# Patient Record
Sex: FEMALE | Race: WHITE | NOT HISPANIC OR LATINO | Employment: UNEMPLOYED | URBAN - METROPOLITAN AREA
[De-identification: names, ages, dates, MRNs, and addresses within clinical notes are randomized per-mention and may not be internally consistent; named-entity substitution may affect disease eponyms.]

---

## 2018-01-04 ENCOUNTER — HOSPITAL ENCOUNTER (OUTPATIENT)
Facility: MEDICAL CENTER | Age: 63
End: 2018-01-04
Attending: FAMILY MEDICINE
Payer: COMMERCIAL

## 2018-01-04 ENCOUNTER — OFFICE VISIT (OUTPATIENT)
Dept: INTERNAL MEDICINE | Facility: IMAGING CENTER | Age: 63
End: 2018-01-04
Payer: COMMERCIAL

## 2018-01-04 VITALS
OXYGEN SATURATION: 98 % | BODY MASS INDEX: 26.75 KG/M2 | HEIGHT: 63 IN | SYSTOLIC BLOOD PRESSURE: 128 MMHG | WEIGHT: 151 LBS | HEART RATE: 82 BPM | DIASTOLIC BLOOD PRESSURE: 75 MMHG | RESPIRATION RATE: 14 BRPM | TEMPERATURE: 97.5 F

## 2018-01-04 DIAGNOSIS — Z12.39 SCREENING BREAST EXAMINATION: ICD-10-CM

## 2018-01-04 DIAGNOSIS — Z12.11 SPECIAL SCREENING FOR MALIGNANT NEOPLASMS, COLON: ICD-10-CM

## 2018-01-04 DIAGNOSIS — Z11.59 NEED FOR HEPATITIS C SCREENING TEST: ICD-10-CM

## 2018-01-04 DIAGNOSIS — Z87.81 HISTORY OF FRACTURE: ICD-10-CM

## 2018-01-04 DIAGNOSIS — M85.80 OSTEOPENIA, UNSPECIFIED LOCATION: ICD-10-CM

## 2018-01-04 DIAGNOSIS — Z00.00 ANNUAL PHYSICAL EXAM: ICD-10-CM

## 2018-01-04 DIAGNOSIS — Z12.4 SCREENING FOR MALIGNANT NEOPLASM OF CERVIX: ICD-10-CM

## 2018-01-04 DIAGNOSIS — F41.9 ANXIETY: ICD-10-CM

## 2018-01-04 PROCEDURE — 99386 PREV VISIT NEW AGE 40-64: CPT | Performed by: FAMILY MEDICINE

## 2018-01-04 PROCEDURE — 88175 CYTOPATH C/V AUTO FLUID REDO: CPT

## 2018-01-04 RX ORDER — BUPROPION HYDROCHLORIDE 150 MG/1
150 TABLET, EXTENDED RELEASE ORAL 2 TIMES DAILY
Qty: 180 TAB | Refills: 1 | Status: SHIPPED | OUTPATIENT
Start: 2018-01-04 | End: 2018-12-12 | Stop reason: SDUPTHER

## 2018-01-04 ASSESSMENT — PATIENT HEALTH QUESTIONNAIRE - PHQ9: CLINICAL INTERPRETATION OF PHQ2 SCORE: 0

## 2018-01-05 LAB — CYTOLOGY REG CYTOL: NORMAL

## 2018-01-05 NOTE — PROGRESS NOTES
CC:  Annual exam.    HPI:  Gabriel is a 62 y.o. female patient here to reestablish care and for her annual exam.   I had taken care of her for many years. Been in 2014 her insurance changed. She has been getting intermittent care since then. She did get a mammogram in 2016. She is due for her colonoscopy. Her last Pap smear was 2014.  She generally feels well.  She has been working many hours in anticipation of hopefully retiring this fall.    She has a history of anxiety and is interested in getting back on Wellbutrin. She also went back to smoking and thinks that it will help her with that. She denies depression but has been under some increased stress.    Past Medical History:   Diagnosis Date   • Anxiety    • Arthritis     wrists, thumbs   • Chronic back pain    • Chronic neck pain    • Heart burn    • History of fracture 4/10/2014   • OSTEOPOROSIS        Past Surgical History:   Procedure Laterality Date   • BREAST BIOPSY  9/25/2009    Performed by JENNIFER GALAN at [facility]   • GANGLION EXCISION  9/26/08    Performed by INGRID OVIEDO at SURGERY [facility] ORS   • CARPAL TUNNEL ENDOSCOPIC  9/26/08    Performed by INGRID OVIEDO at SURGERY [facility] ORS   • COLONOSCOPY  2008    recheck 7 years   • LUMPECTOMY  1972    LEFT BREAST   • CHOLECYSTECTOMY         Family History   Problem Relation Age of Onset   • Hypertension Mother    • Heart Disease Mother      atrial fib   • Diabetes Father    • Cancer Father      pancreatic   • Heart Disease Sister      valvular   • Heart Disease Sister      sinus arrhythmia   • Hypertension Maternal Grandmother    • Stroke Maternal Grandmother        Social History   Substance Use Topics   • Smoking status: Current Every Day Smoker     Packs/day: 0.50     Years: 15.00     Types: Cigarettes     Last attempt to quit: 10/1/2000   • Smokeless tobacco: Never Used   • Alcohol use No     Ready to quit: Not Answered  Counseling given: Not  "Answered     Patient Active Problem List    Diagnosis Date Noted   • History of fracture 04/10/2014   • Osteopenia 08/16/2011   • Anxiety    • Chronic neck pain    • Chronic back pain      Current Outpatient Prescriptions   Medication Sig Dispense Refill   • buPROPion SR (WELLBUTRIN-SR) 150 MG TABLET SR 12 HR sustained-release tablet Take 1 Tab by mouth 2 times a day. 180 Tab 1     No current facility-administered medications for this visit.       Current supplements:none        REVIEW OF SYSTEMS:  GENERAL: No fatigue, no weight loss.  HEENT:  Ears--no earache, no change in hearing, no dizziness, no tinnitus.                 Eyes--no blurred vision, no discharge or pain                 Throat--No sore throat, no dysphagia, no hoarseness  CV:  No chest pain,dyspnea,palpitations or edema.  RESP:  No sob,cough,wheezing or hemoptysis.  GI: No dysphagia, heartburn,abdominal pain, nausea, vomiting, diarrhea or constipation.       No melena, jaundice, bleeding, incontinence or change in bowel habits.  :  No dysuria, polyuria, hematuria, incontinence, or nocturia.  MS:  No joint swelling, myalgias, or arthralgias.  NEURO:  No seizures, syncope, paralysis, tremor, or weakness.  SKIN: No new or concerning skin lesions or changes.   PSYCH: Mood fine.          /75   Pulse 82   Temp 36.4 °C (97.5 °F)   Resp 14   Ht 1.607 m (5' 3.25\")   Wt 68.5 kg (151 lb)   SpO2 98%   BMI 26.54 kg/m²  Body mass index is 26.54 kg/m².    PHYSICAL EXAM;  GENERAL;  WN/WD, No acute distress.   HEENT:  Head normocephalic and atraumatic.    PERRLA, EOMI. No conjunctival injection, no icterus.     TM's normal, nasal mucosa and mouth with no abnormalities.    Oropharynx is clear with no lesions.  NECK: Supple, no adenopathy or thyromegaly.  CV: RR. Normal S1,S2. No murmur, gallop or rub.          No JVD, Carotid pulses 2+ and sym. No bruits.  LUNGS:  Clear, no wheezes, rales or rhonchi.  BREASTS: Symmetric, no masses or tenderness. No " nipple discharge.  AXILLA:  No masses or tenderness.  ABDOMEN: Soft, NT, nondistended. Bowel sounds normal. No hepatosplenomegaly or masses. No rebound or guarding. No hernias  : external genitalia normal with no lesion. Vagina with no lesions or discharge. Cervix nontender with no lesion. Uterus nontender and normal size. Adnexa nontender with no mass.   EXTREMITIES:  No edema.   NEURO:  CN II-XII intact. Motor and sensation grossly intact.   SKIN: No rashes or abnormal lesions.  Psych: Mood and affect are appropriate.            Assessment and Plan. The following treatment and monitoring plan is recommended:   1. Annual physical exam  Pap smear today. Recommend monthly self breast exams and annual mammogram.  Lab work is ordered.  - CBC WITH DIFFERENTIAL; Future  - COMP METABOLIC PANEL; Future  - LIPID PROFILE; Future  - TSH WITH REFLEX TO FT4; Future  - VITAMIN D,25 HYDROXY; Future    2. Screening for malignant neoplasm of cervix  Pap smear    3. Screening breast examination    - MA-MAMMO SCREENING BILAT W/ZEB W/CAD; Future    4. Anxiety  Start Wellbutrin twice daily.  - buPROPion SR (WELLBUTRIN-SR) 150 MG TABLET SR 12 HR sustained-release tablet; Take 1 Tab by mouth 2 times a day.  Dispense: 180 Tab; Refill: 1    5. Osteopenia, unspecified location  Encouraged calcium, vitamin D and exercise    - DS-BONE DENSITY STUDY (DEXA); Future  - VITAMIN D,25 HYDROXY; Future    6. History of fracture    - DS-BONE DENSITY STUDY (DEXA); Future    7. Special screening for malignant neoplasms, colon  She is due for her colonoscopy this year.  - REFERRAL TO GASTROENTEROLOGY    8. Need for hepatitis C screening test    - HEP C VIRUS ANTIBODY; Future    9. Smoker discussed smoking cessation. She'll start Wellbutrin.    10. Healthcare Maintenance. Counseled re: nutrition, activity and safety. Reviewed immunizations.     Follow-up 1 year and as needed. .       ·

## 2018-01-24 ENCOUNTER — HOSPITAL ENCOUNTER (OUTPATIENT)
Dept: RADIOLOGY | Facility: MEDICAL CENTER | Age: 63
End: 2018-01-24

## 2018-02-01 ENCOUNTER — NON-PROVIDER VISIT (OUTPATIENT)
Dept: INTERNAL MEDICINE | Facility: IMAGING CENTER | Age: 63
End: 2018-02-01
Payer: COMMERCIAL

## 2018-02-01 ENCOUNTER — HOSPITAL ENCOUNTER (OUTPATIENT)
Facility: MEDICAL CENTER | Age: 63
End: 2018-02-01
Attending: FAMILY MEDICINE
Payer: COMMERCIAL

## 2018-02-01 DIAGNOSIS — M85.80 OSTEOPENIA, UNSPECIFIED LOCATION: ICD-10-CM

## 2018-02-01 DIAGNOSIS — Z11.59 NEED FOR HEPATITIS C SCREENING TEST: ICD-10-CM

## 2018-02-01 DIAGNOSIS — Z01.89 ROUTINE LAB DRAW: ICD-10-CM

## 2018-02-01 DIAGNOSIS — Z00.00 ANNUAL PHYSICAL EXAM: ICD-10-CM

## 2018-02-01 LAB
25(OH)D3 SERPL-MCNC: 4 NG/ML (ref 30–100)
ALBUMIN SERPL BCP-MCNC: 4.5 G/DL (ref 3.2–4.9)
ALBUMIN/GLOB SERPL: 1.7 G/DL
ALP SERPL-CCNC: 72 U/L (ref 30–99)
ALT SERPL-CCNC: 19 U/L (ref 2–50)
ANION GAP SERPL CALC-SCNC: 5 MMOL/L (ref 0–11.9)
AST SERPL-CCNC: 18 U/L (ref 12–45)
BASOPHILS # BLD AUTO: 1.3 % (ref 0–1.8)
BASOPHILS # BLD: 0.13 K/UL (ref 0–0.12)
BILIRUB SERPL-MCNC: 0.4 MG/DL (ref 0.1–1.5)
BUN SERPL-MCNC: 11 MG/DL (ref 8–22)
CALCIUM SERPL-MCNC: 9.6 MG/DL (ref 8.5–10.5)
CHLORIDE SERPL-SCNC: 108 MMOL/L (ref 96–112)
CHOLEST SERPL-MCNC: 159 MG/DL (ref 100–199)
CO2 SERPL-SCNC: 24 MMOL/L (ref 20–33)
CREAT SERPL-MCNC: 0.93 MG/DL (ref 0.5–1.4)
EOSINOPHIL # BLD AUTO: 0.2 K/UL (ref 0–0.51)
EOSINOPHIL NFR BLD: 2 % (ref 0–6.9)
ERYTHROCYTE [DISTWIDTH] IN BLOOD BY AUTOMATED COUNT: 44.8 FL (ref 35.9–50)
GLOBULIN SER CALC-MCNC: 2.7 G/DL (ref 1.9–3.5)
GLUCOSE SERPL-MCNC: 101 MG/DL (ref 65–99)
HCT VFR BLD AUTO: 48.2 % (ref 37–47)
HCV AB SER QL: NEGATIVE
HDLC SERPL-MCNC: 45 MG/DL
HGB BLD-MCNC: 16.1 G/DL (ref 12–16)
IMM GRANULOCYTES # BLD AUTO: 0.02 K/UL (ref 0–0.11)
IMM GRANULOCYTES NFR BLD AUTO: 0.2 % (ref 0–0.9)
LDLC SERPL CALC-MCNC: 82 MG/DL
LYMPHOCYTES # BLD AUTO: 3.66 K/UL (ref 1–4.8)
LYMPHOCYTES NFR BLD: 36.4 % (ref 22–41)
MCH RBC QN AUTO: 31.3 PG (ref 27–33)
MCHC RBC AUTO-ENTMCNC: 33.4 G/DL (ref 33.6–35)
MCV RBC AUTO: 93.8 FL (ref 81.4–97.8)
MONOCYTES # BLD AUTO: 0.55 K/UL (ref 0–0.85)
MONOCYTES NFR BLD AUTO: 5.5 % (ref 0–13.4)
NEUTROPHILS # BLD AUTO: 5.49 K/UL (ref 2–7.15)
NEUTROPHILS NFR BLD: 54.6 % (ref 44–72)
NRBC # BLD AUTO: 0 K/UL
NRBC BLD-RTO: 0 /100 WBC
PLATELET # BLD AUTO: 241 K/UL (ref 164–446)
PMV BLD AUTO: 12.1 FL (ref 9–12.9)
POTASSIUM SERPL-SCNC: 4 MMOL/L (ref 3.6–5.5)
PROT SERPL-MCNC: 7.2 G/DL (ref 6–8.2)
RBC # BLD AUTO: 5.14 M/UL (ref 4.2–5.4)
SODIUM SERPL-SCNC: 137 MMOL/L (ref 135–145)
TRIGL SERPL-MCNC: 159 MG/DL (ref 0–149)
TSH SERPL DL<=0.005 MIU/L-ACNC: 3.32 UIU/ML (ref 0.38–5.33)
WBC # BLD AUTO: 10.1 K/UL (ref 4.8–10.8)

## 2018-02-01 PROCEDURE — 80061 LIPID PANEL: CPT

## 2018-02-01 PROCEDURE — 86803 HEPATITIS C AB TEST: CPT

## 2018-02-01 PROCEDURE — 80053 COMPREHEN METABOLIC PANEL: CPT

## 2018-02-01 PROCEDURE — 84443 ASSAY THYROID STIM HORMONE: CPT

## 2018-02-01 PROCEDURE — 82306 VITAMIN D 25 HYDROXY: CPT

## 2018-02-01 PROCEDURE — 85025 COMPLETE CBC W/AUTO DIFF WBC: CPT

## 2018-02-02 ENCOUNTER — TELEPHONE (OUTPATIENT)
Dept: INTERNAL MEDICINE | Facility: IMAGING CENTER | Age: 63
End: 2018-02-02

## 2018-02-02 DIAGNOSIS — E55.9 VITAMIN D DEFICIENCY: ICD-10-CM

## 2018-02-02 RX ORDER — ERGOCALCIFEROL 1.25 MG/1
50000 CAPSULE ORAL
Qty: 4 CAP | Refills: 3 | Status: SHIPPED | OUTPATIENT
Start: 2018-02-02 | End: 2018-06-01 | Stop reason: SDUPTHER

## 2018-02-02 NOTE — TELEPHONE ENCOUNTER
Discussed results. Vitamin D is very low. Start 50,000 international units every week and recheck in 3 months.

## 2018-02-05 ENCOUNTER — HOSPITAL ENCOUNTER (OUTPATIENT)
Dept: RADIOLOGY | Facility: MEDICAL CENTER | Age: 63
End: 2018-02-05
Attending: FAMILY MEDICINE
Payer: COMMERCIAL

## 2018-02-05 DIAGNOSIS — M85.80 OSTEOPENIA, UNSPECIFIED LOCATION: ICD-10-CM

## 2018-02-05 DIAGNOSIS — Z12.39 SCREENING BREAST EXAMINATION: ICD-10-CM

## 2018-02-05 DIAGNOSIS — Z87.81 HISTORY OF FRACTURE: ICD-10-CM

## 2018-02-05 PROCEDURE — 77067 SCR MAMMO BI INCL CAD: CPT

## 2018-02-05 PROCEDURE — 77080 DXA BONE DENSITY AXIAL: CPT

## 2018-02-07 ENCOUNTER — TELEPHONE (OUTPATIENT)
Dept: INTERNAL MEDICINE | Facility: IMAGING CENTER | Age: 63
End: 2018-02-07

## 2018-02-07 DIAGNOSIS — L60.0 INGROWN TOENAIL: ICD-10-CM

## 2018-02-07 RX ORDER — ALENDRONATE SODIUM 70 MG/1
70 TABLET ORAL
Qty: 4 TAB | Refills: 12 | Status: SHIPPED | OUTPATIENT
Start: 2018-02-07 | End: 2019-02-23 | Stop reason: SDUPTHER

## 2018-02-08 NOTE — TELEPHONE ENCOUNTER
Discussed side effects. She is osteopenic. Recommend calcium with D which we just added. Her vitamin D was quite low. Also recommend Fosamax weekly. Recheck in 1 year. Discussed side effects.    She also is having difficulties with an ingrown toenail and would like referral to podiatry.

## 2018-05-29 DIAGNOSIS — E55.9 VITAMIN D DEFICIENCY: ICD-10-CM

## 2018-05-30 ENCOUNTER — HOSPITAL ENCOUNTER (OUTPATIENT)
Facility: MEDICAL CENTER | Age: 63
End: 2018-05-30
Attending: FAMILY MEDICINE
Payer: COMMERCIAL

## 2018-05-30 ENCOUNTER — NON-PROVIDER VISIT (OUTPATIENT)
Dept: INTERNAL MEDICINE | Facility: IMAGING CENTER | Age: 63
End: 2018-05-30
Payer: COMMERCIAL

## 2018-05-30 DIAGNOSIS — E55.9 VITAMIN D DEFICIENCY: ICD-10-CM

## 2018-05-30 DIAGNOSIS — Z01.89 ROUTINE LAB DRAW: ICD-10-CM

## 2018-05-30 LAB — 25(OH)D3 SERPL-MCNC: 30 NG/ML (ref 30–100)

## 2018-05-30 PROCEDURE — 82306 VITAMIN D 25 HYDROXY: CPT

## 2018-06-01 DIAGNOSIS — E55.9 VITAMIN D DEFICIENCY: ICD-10-CM

## 2018-06-01 RX ORDER — ERGOCALCIFEROL 1.25 MG/1
50000 CAPSULE ORAL
Qty: 4 CAP | Refills: 3 | Status: SHIPPED | OUTPATIENT
Start: 2018-06-01 | End: 2018-10-22 | Stop reason: SDUPTHER

## 2018-06-08 DIAGNOSIS — E55.9 VITAMIN D DEFICIENCY: ICD-10-CM

## 2018-06-08 RX ORDER — ERGOCALCIFEROL 1.25 MG/1
50000 CAPSULE ORAL
Qty: 4 CAP | Refills: 3 | OUTPATIENT
Start: 2018-06-08

## 2018-09-06 DIAGNOSIS — Z12.11 SPECIAL SCREENING FOR MALIGNANT NEOPLASMS, COLON: ICD-10-CM

## 2018-10-22 DIAGNOSIS — E55.9 VITAMIN D DEFICIENCY: ICD-10-CM

## 2018-10-22 RX ORDER — ERGOCALCIFEROL 1.25 MG/1
CAPSULE ORAL
Qty: 4 CAP | Refills: 3 | Status: SHIPPED | OUTPATIENT
Start: 2018-10-22 | End: 2019-03-22 | Stop reason: SDUPTHER

## 2018-12-12 DIAGNOSIS — F41.9 ANXIETY: ICD-10-CM

## 2018-12-12 RX ORDER — BUPROPION HYDROCHLORIDE 150 MG/1
150 TABLET, EXTENDED RELEASE ORAL 2 TIMES DAILY
Qty: 180 TAB | Refills: 1 | Status: SHIPPED | OUTPATIENT
Start: 2018-12-12 | End: 2019-11-11

## 2019-01-05 DIAGNOSIS — J06.9 UPPER RESPIRATORY TRACT INFECTION, UNSPECIFIED TYPE: ICD-10-CM

## 2019-01-05 RX ORDER — AZITHROMYCIN 250 MG/1
TABLET, FILM COATED ORAL
Qty: 6 TAB | Refills: 0 | Status: SHIPPED | OUTPATIENT
Start: 2019-01-05 | End: 2019-11-11

## 2019-02-25 RX ORDER — ALENDRONATE SODIUM 70 MG/1
70 TABLET ORAL
Qty: 4 TAB | Refills: 9 | Status: SHIPPED | OUTPATIENT
Start: 2019-02-25 | End: 2021-05-17

## 2019-03-22 DIAGNOSIS — E55.9 VITAMIN D DEFICIENCY: ICD-10-CM

## 2019-03-22 RX ORDER — ERGOCALCIFEROL 1.25 MG/1
CAPSULE ORAL
Qty: 12 CAP | Refills: 3 | Status: SHIPPED | OUTPATIENT
Start: 2019-03-22 | End: 2021-05-17

## 2019-11-11 ENCOUNTER — OFFICE VISIT (OUTPATIENT)
Dept: INTERNAL MEDICINE | Facility: IMAGING CENTER | Age: 64
End: 2019-11-11
Payer: COMMERCIAL

## 2019-11-11 ENCOUNTER — HOSPITAL ENCOUNTER (OUTPATIENT)
Dept: RADIOLOGY | Facility: MEDICAL CENTER | Age: 64
End: 2019-11-11
Attending: FAMILY MEDICINE
Payer: COMMERCIAL

## 2019-11-11 VITALS
SYSTOLIC BLOOD PRESSURE: 124 MMHG | OXYGEN SATURATION: 98 % | DIASTOLIC BLOOD PRESSURE: 72 MMHG | RESPIRATION RATE: 14 BRPM | HEART RATE: 97 BPM | BODY MASS INDEX: 26.8 KG/M2 | TEMPERATURE: 97.8 F | WEIGHT: 157 LBS | HEIGHT: 64 IN

## 2019-11-11 DIAGNOSIS — Z23 NEED FOR VACCINATION: ICD-10-CM

## 2019-11-11 DIAGNOSIS — E55.9 VITAMIN D DEFICIENCY: ICD-10-CM

## 2019-11-11 DIAGNOSIS — R07.81 RIB PAIN ON RIGHT SIDE: ICD-10-CM

## 2019-11-11 DIAGNOSIS — F17.200 TOBACCO DEPENDENCE: ICD-10-CM

## 2019-11-11 DIAGNOSIS — Z00.00 PREVENTATIVE HEALTH CARE: ICD-10-CM

## 2019-11-11 DIAGNOSIS — Z12.39 SCREENING BREAST EXAMINATION: ICD-10-CM

## 2019-11-11 PROCEDURE — 90471 IMMUNIZATION ADMIN: CPT | Performed by: FAMILY MEDICINE

## 2019-11-11 PROCEDURE — 99214 OFFICE O/P EST MOD 30 MIN: CPT | Mod: 25 | Performed by: FAMILY MEDICINE

## 2019-11-11 PROCEDURE — 90686 IIV4 VACC NO PRSV 0.5 ML IM: CPT | Performed by: FAMILY MEDICINE

## 2019-11-11 PROCEDURE — 71101 X-RAY EXAM UNILAT RIBS/CHEST: CPT | Mod: RT

## 2019-11-11 RX ORDER — MELOXICAM 7.5 MG/1
7.5 TABLET ORAL DAILY
Qty: 30 TAB | Refills: 0 | Status: SHIPPED | OUTPATIENT
Start: 2019-11-11 | End: 2020-02-20

## 2019-11-11 RX ORDER — VARENICLINE TARTRATE 1 MG/1
1 TABLET, FILM COATED ORAL 2 TIMES DAILY
Qty: 60 TAB | Refills: 2 | Status: SHIPPED | OUTPATIENT
Start: 2019-11-11 | End: 2021-05-17

## 2019-11-11 NOTE — PROGRESS NOTES
Chief Complaint   Patient presents with   • Rib Pain     right side x 6 weeks       HISTORY OF PRESENT ILLNESS: Patient is a 64 y.o. female established patient who presents today complaining of right-sided lower rib pain for the past 6 weeks.  Her  had orthopedic surgery and has been in a wheelchair.  On October 1 she was pushing him up a little bit of an incline and she felt pain in her right lower ribs.  It has continued since then.  It is worse with movements.  She states there is always a dull ache there.  Worse with deep inspiration.  Worse with a cough.  No hemoptysis.  No fevers or chills.  She has tried Tylenol for pain with no improvement.    She also smokes and is interested in some helped to stop smoking.  She is smoking almost a pack a day.  She tried Wellbutrin with out much success.      Patient Active Problem List    Diagnosis Date Noted   • Tobacco dependence 11/11/2019   • Vitamin D deficiency 05/29/2018   • History of fracture 04/10/2014   • Osteopenia 08/16/2011   • Anxiety    • Chronic neck pain    • Chronic back pain      Current Outpatient Medications on File Prior to Visit   Medication Sig Dispense Refill   • vitamin D, Ergocalciferol, (DRISDOL) 23615 units Cap capsule TAKE ONE CAPSULE BY MOUTH EVERY 7 DAYS 12 Cap 3   • alendronate (FOSAMAX) 70 MG Tab TAKE 1 TAB BY MOUTH EVERY 7 DAYS. 4 Tab 9     No current facility-administered medications on file prior to visit.          Past medical, surgical, family, and social history is reviewed and updated in Epic chart by me today.   Medications and allergies reviewed and updated in Epic chart by me today.     REVIEW OF SYSTEMS:  GENERAL: No fatigue, no weight loss.  HEENT:  Ears--no earache, no change in hearing, no dizziness, no tinnitus.                 Eyes--no blurred vision, no discharge or pain                 Throat--No sore throat, no dysphagia, no hoarseness  CV:  No chest pain,dyspnea,palpitations or edema.  RESP:  No  "sob,cough,wheezing or hemoptysis.  GI: No dysphagia, heartburn,abdominal pain, nausea, vomiting, diarrhea or constipation.       No melena, jaundice, bleeding, incontinence or change in bowel habits.  :  No dysuria, polyuria, hematuria, incontinence, or nocturia.  MS: As above  NEURO:  No seizures, syncope, paralysis, tremor, or weakness.  SKIN: No new or concerning skin lesions or changes.   PSYCH: Mood fine.    Vitals:    11/11/19 0800   BP: 124/72   Pulse: 97   Resp: 14   Temp: 36.6 °C (97.8 °F)   TempSrc: Temporal   SpO2: 98%   Weight: 71.2 kg (157 lb)   Height: 1.626 m (5' 4\")     Physical Exam:  Gen: Well developed, well nourished. No acute distress.  Neck:  Supple, no adenopathy or thyromegaly.  Heart:  Regular rate and rhythm.  Normal S1, S2. No murmur, gallop or rub.  Lungs:  Clear, No wheezes,rales or rhonchi.  Her thoracic spine is nontender.  She does have tenderness along her lower right ribs anteriorly and in the mid axillary line.  No crepitus.  No skin changes.  Extremities:  No edema.  Psych: Mood and affect are appropriate.        Assessment/Plan:  1. Rib pain on right side.  Recommend x-ray of her ribs.  She does have osteopenia and is on Fosamax.  I would like to rule out a fracture.  She may take meloxicam for pain. [id number] (WITH 1-VIEW CXR) RIGHT    meloxicam (MOBIC) 7.5 MG Tab   2. Tobacco dependence.  Discussed Chantix.  Encouraged her to call for any concerns. varenicline (CHANTIX STARTING MONTH MONICA) 0.5 MG X 11 & 1 MG X 42 tablet    varenicline (CHANTIX CONTINUING MONTH MONICA) 1 MG tablet   3. Need for vaccination  Influenza Vaccine Quad Injection (PF)   4. Preventative health care  CBC WITH DIFFERENTIAL    Comp Metabolic Panel    Lipid Profile    TSH WITH REFLEX TO FT4   5. Vitamin D deficiency  VITAMIN D,25 HYDROXY   6. Screening breast examination  MA-SCREENING MAMMO BILAT W/TOMOSYNTHESIS W/CAD     She will follow-up in the next 1 to 2 months for her annual exam.  "

## 2019-11-14 ENCOUNTER — HOSPITAL ENCOUNTER (OUTPATIENT)
Facility: MEDICAL CENTER | Age: 64
End: 2019-11-14
Attending: FAMILY MEDICINE
Payer: COMMERCIAL

## 2019-11-14 ENCOUNTER — NON-PROVIDER VISIT (OUTPATIENT)
Dept: INTERNAL MEDICINE | Facility: IMAGING CENTER | Age: 64
End: 2019-11-14
Payer: COMMERCIAL

## 2019-11-14 DIAGNOSIS — E55.9 VITAMIN D DEFICIENCY: ICD-10-CM

## 2019-11-14 DIAGNOSIS — Z00.00 PREVENTATIVE HEALTH CARE: ICD-10-CM

## 2019-11-14 LAB
ALBUMIN SERPL BCP-MCNC: 4.5 G/DL (ref 3.2–4.9)
ALBUMIN/GLOB SERPL: 1.9 G/DL
ALP SERPL-CCNC: 80 U/L (ref 30–99)
ALT SERPL-CCNC: 18 U/L (ref 2–50)
ANION GAP SERPL CALC-SCNC: 6 MMOL/L (ref 0–11.9)
AST SERPL-CCNC: 18 U/L (ref 12–45)
BASOPHILS # BLD AUTO: 1.1 % (ref 0–1.8)
BASOPHILS # BLD: 0.11 K/UL (ref 0–0.12)
BILIRUB SERPL-MCNC: 0.3 MG/DL (ref 0.1–1.5)
BUN SERPL-MCNC: 13 MG/DL (ref 8–22)
CALCIUM SERPL-MCNC: 9.1 MG/DL (ref 8.5–10.5)
CHLORIDE SERPL-SCNC: 110 MMOL/L (ref 96–112)
CHOLEST SERPL-MCNC: 149 MG/DL (ref 100–199)
CO2 SERPL-SCNC: 26 MMOL/L (ref 20–33)
CREAT SERPL-MCNC: 0.89 MG/DL (ref 0.5–1.4)
EOSINOPHIL # BLD AUTO: 0.25 K/UL (ref 0–0.51)
EOSINOPHIL NFR BLD: 2.6 % (ref 0–6.9)
ERYTHROCYTE [DISTWIDTH] IN BLOOD BY AUTOMATED COUNT: 47.1 FL (ref 35.9–50)
GLOBULIN SER CALC-MCNC: 2.4 G/DL (ref 1.9–3.5)
GLUCOSE SERPL-MCNC: 96 MG/DL (ref 65–99)
HCT VFR BLD AUTO: 49.5 % (ref 37–47)
HDLC SERPL-MCNC: 41 MG/DL
HGB BLD-MCNC: 16.2 G/DL (ref 12–16)
IMM GRANULOCYTES # BLD AUTO: 0.03 K/UL (ref 0–0.11)
IMM GRANULOCYTES NFR BLD AUTO: 0.3 % (ref 0–0.9)
LDLC SERPL CALC-MCNC: 78 MG/DL
LYMPHOCYTES # BLD AUTO: 2.92 K/UL (ref 1–4.8)
LYMPHOCYTES NFR BLD: 29.9 % (ref 22–41)
MCH RBC QN AUTO: 31.2 PG (ref 27–33)
MCHC RBC AUTO-ENTMCNC: 32.7 G/DL (ref 33.6–35)
MCV RBC AUTO: 95.4 FL (ref 81.4–97.8)
MONOCYTES # BLD AUTO: 0.55 K/UL (ref 0–0.85)
MONOCYTES NFR BLD AUTO: 5.6 % (ref 0–13.4)
NEUTROPHILS # BLD AUTO: 5.91 K/UL (ref 2–7.15)
NEUTROPHILS NFR BLD: 60.5 % (ref 44–72)
NRBC # BLD AUTO: 0 K/UL
NRBC BLD-RTO: 0 /100 WBC
PLATELET # BLD AUTO: 169 K/UL (ref 164–446)
PMV BLD AUTO: 12.5 FL (ref 9–12.9)
POTASSIUM SERPL-SCNC: 4.2 MMOL/L (ref 3.6–5.5)
PROT SERPL-MCNC: 6.9 G/DL (ref 6–8.2)
RBC # BLD AUTO: 5.19 M/UL (ref 4.2–5.4)
SODIUM SERPL-SCNC: 142 MMOL/L (ref 135–145)
TRIGL SERPL-MCNC: 148 MG/DL (ref 0–149)
TSH SERPL DL<=0.005 MIU/L-ACNC: 2.62 UIU/ML (ref 0.38–5.33)
WBC # BLD AUTO: 9.8 K/UL (ref 4.8–10.8)

## 2019-11-14 PROCEDURE — 80061 LIPID PANEL: CPT

## 2019-11-14 PROCEDURE — 84443 ASSAY THYROID STIM HORMONE: CPT

## 2019-11-14 PROCEDURE — 82306 VITAMIN D 25 HYDROXY: CPT

## 2019-11-14 PROCEDURE — 80053 COMPREHEN METABOLIC PANEL: CPT

## 2019-11-14 PROCEDURE — 85025 COMPLETE CBC W/AUTO DIFF WBC: CPT

## 2019-11-15 LAB — 25(OH)D3 SERPL-MCNC: 30 NG/ML (ref 30–100)

## 2019-11-20 ENCOUNTER — OFFICE VISIT (OUTPATIENT)
Dept: INTERNAL MEDICINE | Facility: IMAGING CENTER | Age: 64
End: 2019-11-20
Payer: COMMERCIAL

## 2019-11-20 VITALS
WEIGHT: 157 LBS | HEART RATE: 87 BPM | BODY MASS INDEX: 26.8 KG/M2 | SYSTOLIC BLOOD PRESSURE: 122 MMHG | DIASTOLIC BLOOD PRESSURE: 70 MMHG | RESPIRATION RATE: 14 BRPM | OXYGEN SATURATION: 98 % | HEIGHT: 64 IN | TEMPERATURE: 96.8 F

## 2019-11-20 DIAGNOSIS — L98.9 SKIN LESIONS: ICD-10-CM

## 2019-11-20 DIAGNOSIS — M85.80 OSTEOPENIA, UNSPECIFIED LOCATION: ICD-10-CM

## 2019-11-20 DIAGNOSIS — E55.9 VITAMIN D DEFICIENCY: ICD-10-CM

## 2019-11-20 DIAGNOSIS — Z00.00 ANNUAL PHYSICAL EXAM: ICD-10-CM

## 2019-11-20 DIAGNOSIS — F17.200 TOBACCO DEPENDENCE: ICD-10-CM

## 2019-11-20 DIAGNOSIS — L82.1 SK (SEBORRHEIC KERATOSIS): ICD-10-CM

## 2019-11-20 PROCEDURE — 99396 PREV VISIT EST AGE 40-64: CPT | Performed by: FAMILY MEDICINE

## 2019-11-20 ASSESSMENT — PATIENT HEALTH QUESTIONNAIRE - PHQ9: CLINICAL INTERPRETATION OF PHQ2 SCORE: 0

## 2019-11-21 NOTE — PROGRESS NOTES
CC:  Annual exam.    HPI:  Gabriel is a 64 y.o. established patient here for her annual exam.    She is generally been doing well.  She was in a couple weeks ago with some rib pain that started after pushing her  in a wheelchair.  She has been taking meloxicam and is doing better.  Still has a little pain but much improved.    She is up-to-date on her Pap smear.  She does have her mammogram and equal density scan scheduled.  She does have osteopenia.  Is on Fosamax weekly.  She has been on this for only about 1 year.  She is tolerating it fine.  She is up-to-date on her colonoscopy    She would like a dermatology referral.  She has several lesions checked.  She has one large lesion on her left side that catches on clothing itches and it is sometimes there is some tenderness.  It has been there for years and it is getting larger.    Past Medical History:   Diagnosis Date   • Anxiety    • Arthritis     wrists, thumbs   • Chronic back pain    • Chronic neck pain    • Heart burn    • History of fracture 4/10/2014   • OSTEOPOROSIS        Past Surgical History:   Procedure Laterality Date   • US-NEEDLE CORE BX-BREAST PANEL Right 2011    benign   • BREAST BIOPSY  9/25/2009    Performed by JENNIFER GALAN at SURGERY [facility] ORS   • GANGLION EXCISION  9/26/08    Performed by INGRID OVIEDO at SURGERY [facility] ORS   • CARPAL TUNNEL ENDOSCOPIC  9/26/08    Performed by INGRID OVIEDO at SURGERY [facility] ORS   • COLONOSCOPY  2008    recheck 7 years   • US-NEEDLE CORE BX-BREAST PANEL Left 1973    benign   • LUMPECTOMY  1972    LEFT BREAST   • CHOLECYSTECTOMY         Family History   Problem Relation Age of Onset   • Hypertension Mother    • Heart Disease Mother         atrial fib   • Diabetes Father    • Cancer Father         pancreatic   • Heart Disease Sister         valvular   • Heart Disease Brother         a fib   • Heart Disease Sister         sinus arrhythmia   • Hypertension  Maternal Grandmother    • Stroke Maternal Grandmother        Social History     Tobacco Use   • Smoking status: Current Every Day Smoker     Packs/day: 0.50     Years: 15.00     Pack years: 7.50     Types: Cigarettes     Last attempt to quit: 10/1/2000     Years since quittin.1   • Smokeless tobacco: Never Used   Substance Use Topics   • Alcohol use: No   • Drug use: No     Ready to quit: Not Answered  Counseling given: Not Answered     Patient Active Problem List    Diagnosis Date Noted   • Tobacco dependence 2019   • Vitamin D deficiency 2018   • History of fracture 04/10/2014   • Osteopenia 2011   • Anxiety    • Chronic neck pain    • Chronic back pain      Current Outpatient Medications   Medication Sig Dispense Refill   • meloxicam (MOBIC) 7.5 MG Tab Take 1 Tab by mouth every day. For rib pain 30 Tab 0   • varenicline (CHANTIX STARTING MONTH MONICA) 0.5 MG X 11 & 1 MG X 42 tablet Take as directed 56 Tab 3   • varenicline (CHANTIX CONTINUING MONTH ) 1 MG tablet Take 1 Tab by mouth 2 times a day. 60 Tab 2   • vitamin D, Ergocalciferol, (DRISDOL) 56977 units Cap capsule TAKE ONE CAPSULE BY MOUTH EVERY 7 DAYS 12 Cap 3   • alendronate (FOSAMAX) 70 MG Tab TAKE 1 TAB BY MOUTH EVERY 7 DAYS. 4 Tab 9     No current facility-administered medications for this visit.           REVIEW OF SYSTEMS:  GENERAL: No fatigue, no weight loss.  HEENT:  Ears--no earache, no change in hearing, no dizziness, no tinnitus.                 Eyes--no blurred vision, no discharge or pain                 Throat--No sore throat, no dysphagia, no hoarseness  CV:  No chest pain,dyspnea,palpitations or edema.  RESP:  No sob,cough,wheezing or hemoptysis.  GI: No dysphagia, heartburn,abdominal pain, nausea, vomiting, diarrhea or constipation.       No melena, jaundice, bleeding, incontinence or change in bowel habits.  :  No dysuria, polyuria, hematuria, incontinence, or nocturia.  MS:  No joint swelling, myalgias, or  "arthralgias.  NEURO:  No seizures, syncope, paralysis, tremor, or weakness.  SKIN: As above  PSYCH: Mood fine.          /70   Pulse 87   Temp 36 °C (96.8 °F) (Temporal)   Resp 14   Ht 1.626 m (5' 4.02\")   Wt 71.2 kg (157 lb)   SpO2 98%   BMI 26.94 kg/m²  Body mass index is 26.94 kg/m².    PHYSICAL EXAM;  GENERAL;  WN/WD, No acute distress.   HEENT:  Head normocephalic and atraumatic.    PERRLA, EOMI. No conjunctival injection, no icterus.     TM's normal, nasal mucosa and mouth with no abnormalities.    Oropharynx is clear with no lesions.  NECK: Supple, no adenopathy or thyromegaly.  CV: RR. Normal S1,S2. No murmur, gallop or rub.          No JVD, Carotid pulses 2+ and sym. No bruits.  LUNGS:  Clear, no wheezes, rales or rhonchi.  BREASTS: Symmetric, no masses or tenderness. No nipple discharge.  AXILLA:  No masses or tenderness.  ABDOMEN: Soft, NT, nondistended. Bowel sounds normal. No hepatosplenomegaly or masses. No rebound or guarding. No hernias.  EXTREMITIES:  No edema.   NEURO:  CN II-XII intact. Motor and sensation grossly intact.   SKIN: No rashes or abnormal lesions.  She has multiple scattered moles and lesions.  She has a 2 cm in diameter rough elevated dark lesion on her left side at about her waistline which is consistent with a seborrheic keratosis.  Psych: Mood and affect are appropriate.    Lab Results   Component Value Date/Time    CHOLSTRLTOT 149 11/14/2019 08:10 AM    LDL 78 11/14/2019 08:10 AM    HDL 41 11/14/2019 08:10 AM    TRIGLYCERIDE 148 11/14/2019 08:10 AM       Lab Results   Component Value Date/Time    SODIUM 142 11/14/2019 08:10 AM    POTASSIUM 4.2 11/14/2019 08:10 AM    CHLORIDE 110 11/14/2019 08:10 AM    CO2 26 11/14/2019 08:10 AM    GLUCOSE 96 11/14/2019 08:10 AM    BUN 13 11/14/2019 08:10 AM    CREATININE 0.89 11/14/2019 08:10 AM    CREATININE 0.97 02/05/2013 09:03 AM    BUNCREATRAT 13 01/14/2010 02:20 PM    GLOMRATE 58 (L) 01/14/2010 02:20 PM     Lab Results "   Component Value Date/Time    ALKPHOSPHAT 80 11/14/2019 08:10 AM    ASTSGOT 18 11/14/2019 08:10 AM    ALTSGPT 18 11/14/2019 08:10 AM    TBILIRUBIN 0.3 11/14/2019 08:10 AM        Lab Results   Component Value Date/Time    WBC 9.8 11/14/2019 08:10 AM    WBC 8.4 02/05/2013 09:03 AM    RBC 5.19 11/14/2019 08:10 AM    RBC 5.15 02/05/2013 09:03 AM    HEMOGLOBIN 16.2 (H) 11/14/2019 08:10 AM    HEMATOCRIT 49.5 (H) 11/14/2019 08:10 AM    MCV 95.4 11/14/2019 08:10 AM    MCV 95.2 02/05/2013 09:03 AM    MCH 31.2 11/14/2019 08:10 AM    MCH 31.4 02/05/2013 09:03 AM    MCHC 32.7 (L) 11/14/2019 08:10 AM    MPV 12.5 11/14/2019 08:10 AM    NEUTSPOLYS 60.50 11/14/2019 08:10 AM    LYMPHOCYTES 29.90 11/14/2019 08:10 AM    MONOCYTES 5.60 11/14/2019 08:10 AM    EOSINOPHILS 2.60 11/14/2019 08:10 AM    BASOPHILS 1.10 11/14/2019 08:10 AM                Assessment and Plan. The following treatment and monitoring plan is recommended:   1. Annual physical exam  Pap smear next year.  Recommend monthly self breast exams and annual mammogram.    2. Vitamin D deficiency  Continue replacement therapy.    3. Tobacco dependence  Encouraged cessation.  Her last visit she did get a prescription for Chantix and would like to start it after the first of the year.  Offered her any support that might help.    4. Osteopenia, unspecified location  She continues on Fosamax, calcium and vitamin D.  Encouraged exercise.  Monitor DEXA scan    5. Skin lesions  Recommend annual skin check with dermatology.  - REFERRAL TO DERMATOLOGY    6. SK (seborrheic keratosis)  Since the large lesion on her left side bothers her, it is treated today with cryotherapy.  She is instructed in wound care and will call for any concerns.    7. Healthcare Maintenance. Counseled re: nutrition, activity and safety. Reviewed immunizations.     Follow-up 6 months and as needed      ·

## 2019-12-23 ENCOUNTER — HOSPITAL ENCOUNTER (OUTPATIENT)
Dept: RADIOLOGY | Facility: MEDICAL CENTER | Age: 64
End: 2019-12-23
Attending: FAMILY MEDICINE
Payer: COMMERCIAL

## 2019-12-23 DIAGNOSIS — Z12.39 SCREENING BREAST EXAMINATION: ICD-10-CM

## 2019-12-23 PROCEDURE — 77063 BREAST TOMOSYNTHESIS BI: CPT

## 2020-02-20 ENCOUNTER — OFFICE VISIT (OUTPATIENT)
Dept: INTERNAL MEDICINE | Facility: IMAGING CENTER | Age: 65
End: 2020-02-20
Payer: COMMERCIAL

## 2020-02-20 ENCOUNTER — HOSPITAL ENCOUNTER (OUTPATIENT)
Dept: LAB | Facility: MEDICAL CENTER | Age: 65
End: 2020-02-20
Attending: FAMILY MEDICINE
Payer: COMMERCIAL

## 2020-02-20 VITALS
OXYGEN SATURATION: 97 % | BODY MASS INDEX: 25.95 KG/M2 | DIASTOLIC BLOOD PRESSURE: 63 MMHG | RESPIRATION RATE: 17 BRPM | SYSTOLIC BLOOD PRESSURE: 121 MMHG | WEIGHT: 152 LBS | TEMPERATURE: 98 F | HEIGHT: 64 IN | HEART RATE: 66 BPM

## 2020-02-20 DIAGNOSIS — F51.01 PRIMARY INSOMNIA: ICD-10-CM

## 2020-02-20 DIAGNOSIS — Z12.4 SCREENING FOR MALIGNANT NEOPLASM OF CERVIX: ICD-10-CM

## 2020-02-20 DIAGNOSIS — Z00.00 ANNUAL PHYSICAL EXAM: ICD-10-CM

## 2020-02-20 DIAGNOSIS — M85.80 OSTEOPENIA, UNSPECIFIED LOCATION: ICD-10-CM

## 2020-02-20 DIAGNOSIS — Z23 NEED FOR VACCINATION: ICD-10-CM

## 2020-02-20 DIAGNOSIS — F41.9 ANXIETY: ICD-10-CM

## 2020-02-20 LAB — CYTOLOGY REG CYTOL: NORMAL

## 2020-02-20 PROCEDURE — 90715 TDAP VACCINE 7 YRS/> IM: CPT | Performed by: FAMILY MEDICINE

## 2020-02-20 PROCEDURE — 90750 HZV VACC RECOMBINANT IM: CPT | Performed by: FAMILY MEDICINE

## 2020-02-20 PROCEDURE — 90472 IMMUNIZATION ADMIN EACH ADD: CPT | Performed by: FAMILY MEDICINE

## 2020-02-20 PROCEDURE — 88175 CYTOPATH C/V AUTO FLUID REDO: CPT

## 2020-02-20 PROCEDURE — 99396 PREV VISIT EST AGE 40-64: CPT | Mod: 25 | Performed by: FAMILY MEDICINE

## 2020-02-20 PROCEDURE — 90471 IMMUNIZATION ADMIN: CPT | Performed by: FAMILY MEDICINE

## 2020-02-20 RX ORDER — CEFDINIR 300 MG/1
300 CAPSULE ORAL 2 TIMES DAILY
Qty: 20 CAP | Refills: 0 | Status: SHIPPED | OUTPATIENT
Start: 2020-02-20 | End: 2021-01-15

## 2020-02-20 RX ORDER — ALPRAZOLAM 0.25 MG/1
0.25 TABLET ORAL NIGHTLY PRN
Qty: 30 TAB | Refills: 0 | Status: SHIPPED
Start: 2020-02-20 | End: 2020-03-21

## 2020-02-24 ASSESSMENT — PATIENT HEALTH QUESTIONNAIRE - PHQ9: CLINICAL INTERPRETATION OF PHQ2 SCORE: 0

## 2020-02-24 NOTE — PROGRESS NOTES
CC:  Annual exam.    HPI:  Gabriel is a 64 y.o. established female patient here for annual exam.  She is doing well.  She is due for a Pap smear.  Mammogram is up-to-date.  Colonoscopy is up-to-date.  She has no GYN complaints.    She is down to smoking just 2 cigarettes daily.  She has been using Chantix and it is working well for her but she does have some insomnia with it.  She would like to continue it for another month.  She denies any depression or hopelessness.    She is also complaining of right ankle pain.  She twisted it about a month ago.  And she still has some discomfort.  It has lessened.    Past Medical History:   Diagnosis Date   • Anxiety    • Arthritis     wrists, thumbs   • Chronic back pain    • Chronic neck pain    • Heart burn    • History of fracture 4/10/2014   • OSTEOPOROSIS        Past Surgical History:   Procedure Laterality Date   • US-NEEDLE CORE BX-BREAST PANEL Right 2011    benign   • BREAST BIOPSY  9/25/2009    Performed by JENNIFER GALAN at SURGERY SAME DAY [facility] ORS   • GANGLION EXCISION  9/26/08    Performed by INGRID OVIEDO at SURGERY [facility] ORS   • CARPAL TUNNEL ENDOSCOPIC  9/26/08    Performed by INGRID OVIEDO at SURGERY [facility] ORS   • COLONOSCOPY  2008    recheck 7 years   • US-NEEDLE CORE BX-BREAST PANEL Left 1973    benign   • LUMPECTOMY  1972    LEFT BREAST   • CHOLECYSTECTOMY         Family History   Problem Relation Age of Onset   • Hypertension Mother    • Heart Disease Mother         atrial fib   • Diabetes Father    • Cancer Father         pancreatic   • Heart Disease Sister         valvular   • Heart Disease Brother         a fib   • Heart Disease Sister         sinus arrhythmia   • Diabetes Sister    • Hypertension Maternal Grandmother    • Stroke Maternal Grandmother        Social History     Tobacco Use   • Smoking status: Former Smoker     Packs/day: 0.50     Years: 15.00     Pack years: 7.50     Types: Cigarettes     Last attempt  to quit: 10/1/2000     Years since quittin.4   • Smokeless tobacco: Never Used   Substance Use Topics   • Alcohol use: No   • Drug use: No        Patient Active Problem List    Diagnosis Date Noted   • Tobacco dependence 2019   • Vitamin D deficiency 2018   • History of fracture 04/10/2014   • Osteopenia 2011   • Anxiety    • Chronic neck pain    • Chronic back pain      Current Outpatient Medications   Medication Sig Dispense Refill   • ALPRAZolam (XANAX) 0.25 MG Tab Take 1 Tab by mouth at bedtime as needed for Sleep or Anxiety for up to 30 days. 30 Tab 0   • cefdinir (OMNICEF) 300 MG Cap Take 1 Cap by mouth 2 times a day. 20 Cap 0   • alendronate (FOSAMAX) 70 MG Tab TAKE 1 TAB BY MOUTH EVERY 7 DAYS. 4 Tab 9   • varenicline (CHANTIX CONTINUING MONTH MONICA) 1 MG tablet Take 1 Tab by mouth 2 times a day. 60 Tab 2   • vitamin D, Ergocalciferol, (DRISDOL) 94811 units Cap capsule TAKE ONE CAPSULE BY MOUTH EVERY 7 DAYS 12 Cap 3     No current facility-administered medications for this visit.               REVIEW OF SYSTEMS:  GENERAL: No fatigue, no weight loss.  HEENT:  Ears--no earache, no change in hearing, no dizziness, no tinnitus.                 Eyes--no blurred vision, no discharge or pain                 Throat--No sore throat, no dysphagia, no hoarseness  CV:  No chest pain,dyspnea,palpitations or edema.  RESP:  No sob,cough,wheezing or hemoptysis.  GI: No dysphagia, heartburn,abdominal pain, nausea, vomiting, diarrhea or constipation.       No melena, jaundice, bleeding, incontinence or change in bowel habits.  :  No dysuria, polyuria, hematuria, incontinence, or nocturia.  MS:  No joint swelling, myalgias, or arthralgias.  NEURO:  No seizures, syncope, paralysis, tremor, or weakness.  SKIN: No new or concerning skin lesions or changes.   PSYCH: Mood fine.          /63 (BP Location: Left arm, Patient Position: Sitting, BP Cuff Size: Adult)   Pulse 66   Temp 36.7 °C (98 °F)  "(Temporal)   Resp 17   Ht 1.626 m (5' 4\")   Wt 68.9 kg (152 lb)   SpO2 97%   BMI 26.09 kg/m²  Body mass index is 26.09 kg/m².    PHYSICAL EXAM;  GENERAL;  WN/WD, No acute distress.   HEENT:  Head normocephalic and atraumatic.    PERRLA, EOMI. No conjunctival injection, no icterus.     TM's normal, nasal mucosa and mouth with no abnormalities.    Oropharynx is clear with no lesions.  NECK: Supple, no adenopathy or thyromegaly.  CV: RR. Normal S1,S2. No murmur, gallop or rub.          No JVD, Carotid pulses 2+ and sym. No bruits.  LUNGS:  Clear, no wheezes, rales or rhonchi.  BREASTS: Symmetric, no masses or tenderness. No nipple discharge.  AXILLA:  No masses or tenderness.  ABDOMEN: Soft, NT, nondistended. Bowel sounds normal. No hepatosplenomegaly or masses. No rebound or guarding. No hernias.  : external genitalia normal with no lesion. Vagina with no lesions or discharge. Cervix nontender with no lesion. Uterus nontender and normal size. Adnexa nontender with no mass.   EXTREMITIES:  No edema.   NEURO:  CN II-XII intact. Motor and sensation grossly intact.  SKIN: No rashes or abnormal lesions.  Psych: Mood and affect are appropriate.    Lab Results   Component Value Date/Time    CHOLSTRLTOT 149 11/14/2019 08:10 AM    LDL 78 11/14/2019 08:10 AM    HDL 41 11/14/2019 08:10 AM    TRIGLYCERIDE 148 11/14/2019 08:10 AM       Lab Results   Component Value Date/Time    SODIUM 142 11/14/2019 08:10 AM    POTASSIUM 4.2 11/14/2019 08:10 AM    CHLORIDE 110 11/14/2019 08:10 AM    CO2 26 11/14/2019 08:10 AM    GLUCOSE 96 11/14/2019 08:10 AM    BUN 13 11/14/2019 08:10 AM    CREATININE 0.89 11/14/2019 08:10 AM    CREATININE 0.97 02/05/2013 09:03 AM    BUNCREATRAT 13 01/14/2010 02:20 PM    GLOMRATE 58 (L) 01/14/2010 02:20 PM     Lab Results   Component Value Date/Time    ALKPHOSPHAT 80 11/14/2019 08:10 AM    ASTSGOT 18 11/14/2019 08:10 AM    ALTSGPT 18 11/14/2019 08:10 AM    TBILIRUBIN 0.3 11/14/2019 08:10 AM        Lab " Results   Component Value Date/Time    WBC 9.8 11/14/2019 08:10 AM    WBC 8.4 02/05/2013 09:03 AM    RBC 5.19 11/14/2019 08:10 AM    RBC 5.15 02/05/2013 09:03 AM    HEMOGLOBIN 16.2 (H) 11/14/2019 08:10 AM    HEMATOCRIT 49.5 (H) 11/14/2019 08:10 AM    MCV 95.4 11/14/2019 08:10 AM    MCV 95.2 02/05/2013 09:03 AM    MCH 31.2 11/14/2019 08:10 AM    MCH 31.4 02/05/2013 09:03 AM    MCHC 32.7 (L) 11/14/2019 08:10 AM    MPV 12.5 11/14/2019 08:10 AM    NEUTSPOLYS 60.50 11/14/2019 08:10 AM    LYMPHOCYTES 29.90 11/14/2019 08:10 AM    MONOCYTES 5.60 11/14/2019 08:10 AM    EOSINOPHILS 2.60 11/14/2019 08:10 AM    BASOPHILS 1.10 11/14/2019 08:10 AM                Assessment and Plan. The following treatment and monitoring plan is recommended:   1. Annual physical exam  Pap smear today.  Recommend monthly self breast exams and annual mammogram    2. Screening for malignant neoplasm of cervix  Pap smear today    3. Primary insomnia  She is given a prescription of Xanax to take for sleep as well as for anxiety when traveling.  - ALPRAZolam (XANAX) 0.25 MG Tab; Take 1 Tab by mouth at bedtime as needed for Sleep or Anxiety for up to 30 days.  Dispense: 30 Tab; Refill: 0    4. Anxiety    - ALPRAZolam (XANAX) 0.25 MG Tab; Take 1 Tab by mouth at bedtime as needed for Sleep or Anxiety for up to 30 days.  Dispense: 30 Tab; Refill: 0    5. Osteopenia, unspecified location  Continue calcium, vitamin D and exercise    6. Need for vaccination    - Shingles Vaccine (Shingrix)  - Tdap =>6yo IM    7.  Healthcare Maintenance. Counseled re: nutrition, activity and safety. Reviewed immunizations.    followup 6 months and prn.  ·

## 2020-11-17 ENCOUNTER — PATIENT MESSAGE (OUTPATIENT)
Dept: INTERNAL MEDICINE | Facility: IMAGING CENTER | Age: 65
End: 2020-11-17

## 2020-11-18 RX ORDER — CEFDINIR 300 MG/1
300 CAPSULE ORAL 2 TIMES DAILY
Qty: 10 CAP | Refills: 0 | Status: SHIPPED | OUTPATIENT
Start: 2020-11-18 | End: 2021-01-15

## 2020-12-08 ENCOUNTER — NON-PROVIDER VISIT (OUTPATIENT)
Dept: INTERNAL MEDICINE | Facility: IMAGING CENTER | Age: 65
End: 2020-12-08
Payer: COMMERCIAL

## 2020-12-08 DIAGNOSIS — Z23 NEED FOR VACCINATION: ICD-10-CM

## 2020-12-08 PROCEDURE — 90471 IMMUNIZATION ADMIN: CPT | Performed by: FAMILY MEDICINE

## 2020-12-08 PROCEDURE — 90750 HZV VACC RECOMBINANT IM: CPT | Performed by: FAMILY MEDICINE

## 2021-01-15 ENCOUNTER — HOSPITAL ENCOUNTER (OUTPATIENT)
Facility: MEDICAL CENTER | Age: 66
End: 2021-01-15
Attending: INTERNAL MEDICINE
Payer: COMMERCIAL

## 2021-01-15 ENCOUNTER — NURSE TRIAGE (OUTPATIENT)
Dept: HEALTH INFORMATION MANAGEMENT | Facility: OTHER | Age: 66
End: 2021-01-15

## 2021-01-15 ENCOUNTER — NON-PROVIDER VISIT (OUTPATIENT)
Dept: INTERNAL MEDICINE | Facility: IMAGING CENTER | Age: 66
End: 2021-01-15
Payer: COMMERCIAL

## 2021-01-15 DIAGNOSIS — R31.0 GROSS HEMATURIA: ICD-10-CM

## 2021-01-15 LAB
APPEARANCE UR: NORMAL
BILIRUB UR STRIP-MCNC: NEGATIVE MG/DL
COLOR UR AUTO: NORMAL
GLUCOSE UR STRIP.AUTO-MCNC: NEGATIVE MG/DL
KETONES UR STRIP.AUTO-MCNC: NEGATIVE MG/DL
LEUKOCYTE ESTERASE UR QL STRIP.AUTO: NORMAL
NITRITE UR QL STRIP.AUTO: NEGATIVE
PH UR STRIP.AUTO: 6.5 [PH] (ref 5–8)
PROT UR QL STRIP: 100 MG/DL
RBC UR QL AUTO: NORMAL
SP GR UR STRIP.AUTO: 1.01
UROBILINOGEN UR STRIP-MCNC: 0.2 MG/DL

## 2021-01-15 PROCEDURE — 87086 URINE CULTURE/COLONY COUNT: CPT

## 2021-01-15 PROCEDURE — 81002 URINALYSIS NONAUTO W/O SCOPE: CPT | Performed by: INTERNAL MEDICINE

## 2021-01-15 RX ORDER — NITROFURANTOIN 25; 75 MG/1; MG/1
100 CAPSULE ORAL 2 TIMES DAILY
Qty: 14 CAP | Refills: 0 | Status: SHIPPED | OUTPATIENT
Start: 2021-01-15 | End: 2021-04-02

## 2021-01-15 NOTE — TELEPHONE ENCOUNTER
"Bladder infection in late November & given antibiotic by pcp & cleared up infection.      Started symptoms of possible bladder infection yesterday morning.        Reason for Disposition  • Patient wants to be seen    Additional Information  • Negative: Shock suspected (e.g., cold/pale/clammy skin, too weak to stand, low BP, rapid pulse)  • Negative: Sounds like a life-threatening emergency to the triager  • Negative: Urinary catheter, questions about  • Negative: Recent back or abdominal injury  • Negative: Recent genital injury  • Negative: Unable to urinate (or only a few drops) > 4 hours and bladder feels very full (e.g., palpable bladder or strong urge to urinate)  • Negative: Passing pure blood or large blood clots (i.e., larger than a dime or grape)  • Negative: Fever > 100.5 F (38.1 C)  • Negative: Patient sounds very sick or weak to the triager  • Negative: Known sickle cell disease  • Negative: Taking Coumadin (warfarin) or other strong blood thinner, or known bleeding disorder (e.g., thrombocytopenia)    Answer Assessment - Initial Assessment Questions  1. COLOR of URINE: \"Describe the color of the urine.\"  (e.g., tea-colored, pink, red, blood clots, bloody)      Dark red, passing little clots, biggest clot was 1/2 size of dime, never had this happen before, occurred this morning   2. ONSET: \"When did the bleeding start?\"       This morning when she passed urine, did not occurred w/first urine but when she experienced urge to go which is about every 5 minutes, this has occurred a dozen times this morning, kleenex is red  3. EPISODES: \"How many times has there been blood in the urine?\" or \"How many times today?\"      12 times  4. PAIN with URINATION: \"Is there any pain with passing your urine?\" If so, ask: \"How bad is the pain?\"  (Scale 1-10; or mild, moderate, severe)     - MILD - complains slightly about urination hurting     - MODERATE - interferes with normal activities       - SEVERE - excruciating, " "unwilling or unable to urinate because of the pain       Pain @ end of passing urine, 5  5. FEVER: \"Do you have a fever?\" If so, ask: \"What is your temperature, how was it measured, and when did it start?\"      no  6. ASSOCIATED SYMPTOMS: \"Are you passing urine more frequently than usual?\"      yes  7. OTHER SYMPTOMS: \"Do you have any other symptoms?\" (e.g., back/flank pain, abdominal pain, vomiting)      no  8. PREGNANCY: \"Is there any chance you are pregnant?\" \"When was your last menstrual period?\"      NA    Protocols used: URINE - BLOOD IN-A-OH      "

## 2021-01-17 LAB
BACTERIA UR CULT: NORMAL
SIGNIFICANT IND 70042: NORMAL
SITE SITE: NORMAL
SOURCE SOURCE: NORMAL

## 2021-01-29 DIAGNOSIS — R05.9 COUGH: ICD-10-CM

## 2021-01-29 NOTE — PROGRESS NOTES
Gabriel is complaining of cough, congestion, lost of taste and smell, and fatigue.  She will get swabbed at the drive-thru.

## 2021-01-30 ENCOUNTER — HOSPITAL ENCOUNTER (OUTPATIENT)
Dept: LAB | Facility: MEDICAL CENTER | Age: 66
End: 2021-01-30
Attending: INTERNAL MEDICINE
Payer: COMMERCIAL

## 2021-01-30 LAB
COVID ORDER STATUS COVID19: NORMAL
SARS-COV-2 RNA RESP QL NAA+PROBE: NOTDETECTED
SPECIMEN SOURCE: NORMAL

## 2021-01-30 PROCEDURE — U0005 INFEC AGEN DETEC AMPLI PROBE: HCPCS

## 2021-01-30 PROCEDURE — U0003 INFECTIOUS AGENT DETECTION BY NUCLEIC ACID (DNA OR RNA); SEVERE ACUTE RESPIRATORY SYNDROME CORONAVIRUS 2 (SARS-COV-2) (CORONAVIRUS DISEASE [COVID-19]), AMPLIFIED PROBE TECHNIQUE, MAKING USE OF HIGH THROUGHPUT TECHNOLOGIES AS DESCRIBED BY CMS-2020-01-R: HCPCS

## 2021-01-30 PROCEDURE — C9803 HOPD COVID-19 SPEC COLLECT: HCPCS

## 2021-02-10 ENCOUNTER — HOSPITAL ENCOUNTER (OUTPATIENT)
Facility: MEDICAL CENTER | Age: 66
End: 2021-02-10
Attending: FAMILY MEDICINE
Payer: COMMERCIAL

## 2021-02-10 ENCOUNTER — NON-PROVIDER VISIT (OUTPATIENT)
Dept: INTERNAL MEDICINE | Facility: IMAGING CENTER | Age: 66
End: 2021-02-10
Payer: COMMERCIAL

## 2021-02-10 DIAGNOSIS — R30.0 DYSURIA: ICD-10-CM

## 2021-02-10 LAB
APPEARANCE UR: CLEAR
BILIRUB UR STRIP-MCNC: NEGATIVE MG/DL
COLOR UR AUTO: YELLOW
GLUCOSE UR STRIP.AUTO-MCNC: NEGATIVE MG/DL
KETONES UR STRIP.AUTO-MCNC: NEGATIVE MG/DL
LEUKOCYTE ESTERASE UR QL STRIP.AUTO: NORMAL
NITRITE UR QL STRIP.AUTO: NEGATIVE
PH UR STRIP.AUTO: 5 [PH] (ref 5–8)
PROT UR QL STRIP: NEGATIVE MG/DL
RBC UR QL AUTO: NORMAL
SP GR UR STRIP.AUTO: 1.02
UROBILINOGEN UR STRIP-MCNC: 0.2 MG/DL

## 2021-02-10 PROCEDURE — 87086 URINE CULTURE/COLONY COUNT: CPT

## 2021-02-10 PROCEDURE — 81002 URINALYSIS NONAUTO W/O SCOPE: CPT | Performed by: FAMILY MEDICINE

## 2021-02-11 NOTE — NON-PROVIDER
Gabriel is having urinary frequency and slight burning sensation.  She is going to wait until her culture results.  In the meantime she will take pyridium and drink plenty of fluids.

## 2021-02-13 LAB
BACTERIA UR CULT: NORMAL
SIGNIFICANT IND 70042: NORMAL
SITE SITE: NORMAL
SOURCE SOURCE: NORMAL

## 2021-04-02 ENCOUNTER — OFFICE VISIT (OUTPATIENT)
Dept: INTERNAL MEDICINE | Facility: IMAGING CENTER | Age: 66
End: 2021-04-02
Payer: COMMERCIAL

## 2021-04-02 VITALS
SYSTOLIC BLOOD PRESSURE: 126 MMHG | WEIGHT: 154 LBS | HEIGHT: 64 IN | BODY MASS INDEX: 26.29 KG/M2 | DIASTOLIC BLOOD PRESSURE: 72 MMHG | RESPIRATION RATE: 14 BRPM | TEMPERATURE: 96.9 F | OXYGEN SATURATION: 99 % | HEART RATE: 77 BPM

## 2021-04-02 DIAGNOSIS — M85.80 OSTEOPENIA, UNSPECIFIED LOCATION: ICD-10-CM

## 2021-04-02 DIAGNOSIS — Z76.89 ESTABLISHING CARE WITH NEW DOCTOR, ENCOUNTER FOR: ICD-10-CM

## 2021-04-02 DIAGNOSIS — R31.9 HEMATURIA, UNSPECIFIED TYPE: ICD-10-CM

## 2021-04-02 DIAGNOSIS — R31.0 GROSS HEMATURIA: ICD-10-CM

## 2021-04-02 DIAGNOSIS — Z12.31 ENCOUNTER FOR SCREENING MAMMOGRAM FOR BREAST CANCER: ICD-10-CM

## 2021-04-02 DIAGNOSIS — Z00.00 LABORATORY EXAMINATION ORDERED AS PART OF A ROUTINE GENERAL MEDICAL EXAMINATION: ICD-10-CM

## 2021-04-02 DIAGNOSIS — F17.200 TOBACCO DEPENDENCE: ICD-10-CM

## 2021-04-02 DIAGNOSIS — E55.9 VITAMIN D DEFICIENCY: ICD-10-CM

## 2021-04-02 DIAGNOSIS — S99.911A ANKLE INJURY, RIGHT, INITIAL ENCOUNTER: ICD-10-CM

## 2021-04-02 DIAGNOSIS — Z91.89 OTHER SPECIFIED PERSONAL RISK FACTORS, NOT ELSEWHERE CLASSIFIED: ICD-10-CM

## 2021-04-02 DIAGNOSIS — Z87.39 PERSONAL HISTORY OF OSTEOPOROSIS: ICD-10-CM

## 2021-04-02 PROCEDURE — 99215 OFFICE O/P EST HI 40 MIN: CPT | Performed by: FAMILY MEDICINE

## 2021-04-02 RX ORDER — VITAMIN B COMPLEX
2000 TABLET ORAL DAILY
COMMUNITY

## 2021-04-02 SDOH — HEALTH STABILITY: PHYSICAL HEALTH: ON AVERAGE, HOW MANY DAYS PER WEEK DO YOU ENGAGE IN MODERATE TO STRENUOUS EXERCISE (LIKE A BRISK WALK)?: 7 DAYS

## 2021-04-02 SDOH — HEALTH STABILITY: PHYSICAL HEALTH: ON AVERAGE, HOW MANY MINUTES DO YOU ENGAGE IN EXERCISE AT THIS LEVEL?: 90 MIN

## 2021-04-02 ASSESSMENT — PATIENT HEALTH QUESTIONNAIRE - PHQ9: CLINICAL INTERPRETATION OF PHQ2 SCORE: 0

## 2021-04-02 ASSESSMENT — FIBROSIS 4 INDEX: FIB4 SCORE: 1.61

## 2021-04-03 NOTE — PROGRESS NOTES
"Chief Complaint   Patient presents with   • Establish Care   • Urinary Frequency     urine culture has been negative   • Ankle Injury     right   • Hot Flashes       Subjective:     HPI:   Gabriel Gonzalez is a 65 y.o. female here to establish care and to discuss the evaluation and management of:       Problem   Gross Hematuria    Recurrent-she is concerned because she does not have a UTI  She reports frequency and prior history of hematuria  She has not been seen by a urologist       Tobacco Dependence    She was able to quit for 4 months with Chantix but due to stress started smoking again  She reports that Chantix causes negative thinking and sleep issues     Osteopenia    She does have history of osteoporosis she has been off alendronate for  A while  She is taking 2000 units of vitamin D3 daily          She is concerned about dementia as both her mother and sister suffered from it     she also complains of right ankle Crush injury that she had in January 2020  She reports she will have throbbing pain in her right ankle       Objective:     /72   Pulse 77   Temp 36.1 °C (96.9 °F) (Temporal)   Resp 14   Ht 1.626 m (5' 4\")   Wt 69.9 kg (154 lb)   SpO2 99%  Body mass index is 26.43 kg/m².    Physical Exam:  Physical Exam  Constitutional: Well-developed and well-nourished. Not diaphoretic. No distress.   Skin: Skin is warm and dry. No rash noted.  Head: Atraumatic without lesions.  Eyes: Conjunctivae and extraocular motions are normal.   Ears:  External ears unremarkable.    Nose: Nares patent. Mucosa without edema or erythema. No discharge. No facial tenderness.     Neck: Supple, No thyromegaly present. No JVD  Cardiovascular: Regular rate and rhythm.   Chest: Effort normal. Clear to auscultation throughout. No adventitious sounds.   Abdomen:  without distention.  .  Extremities: No cyanosis, clubbing, erythema, nor edema.   Neurological: Alert and oriented x 3.    Psychiatric:  Behavior, mood, and " affect are appropriate     Assessment and Plan:     The following treatment plan was discussed/researched:      Tobacco dependence  Encourage cessation    Osteopenia  Currently off alendronate    Gross hematuria  Referred patient to urology as this is recurrent and UTI has been ruled out       3. Vitamin D deficiency    4. Laboratory examination ordered as part of a routine general medical examination  - CBC WITH DIFFERENTIAL; Future  - Comp Metabolic Panel; Future  - Lipid Profile; Future  - TSH; Future    5. Hematuria, unspecified type  - REFERRAL TO UROLOGY    6. Personal history of osteoporosis  - DS-BONE DENSITY STUDY (DEXA); Future    7. Ankle injury, right, initial encounter  - REFERRAL TO PHYSICAL THERAPY    8. Other specified personal risk factors, not elsewhere classified  - CT-HEART W/O CONT EVAL CALCIUM; Future         10. Encounter for screening mammogram for breast cancer  - MA-SCREENING MAMMO BILAT W/TOMOSYNTHESIS W/CAD; Future    11. Establishing care with new doctor, encounter for    Other orders  - vitamin D (CHOLECALCIFEROL) 1000 Unit (25 mcg) Tab; Take 2,000 Units by mouth every day.  - DX-ANKLE 3+ VIEWS RIGHT; Standing  - DX-ANKLE 3+ VIEWS RIGHT                                                                                                                                                                                    Any change or worsening of signs or symptoms, patient encouraged to follow-up or report to emergency room for further evaluation. Patient verbalizes understanding and agrees.    Follow-Up: No follow-ups on file.      PLEASE NOTE: This dictation was created using voice recognition software. I have made every reasonable attempt to correct obvious errors, but I expect that there are errors of grammar and possibly content that I did not discover before finalizing the note.      My total time spent caring for the patient on the day of the encounter was  greater than 40 minutes.    This includes obtaining history, reviewing chart, physical exam, patient education, reviewing outside records, placing orders, interpreting tests and coordinating care.

## 2021-04-15 ENCOUNTER — HOSPITAL ENCOUNTER (OUTPATIENT)
Dept: RADIOLOGY | Facility: MEDICAL CENTER | Age: 66
End: 2021-04-15
Attending: FAMILY MEDICINE
Payer: COMMERCIAL

## 2021-04-15 DIAGNOSIS — S99.911A ANKLE INJURY, RIGHT, INITIAL ENCOUNTER: ICD-10-CM

## 2021-04-15 DIAGNOSIS — Z91.89 OTHER SPECIFIED PERSONAL RISK FACTORS, NOT ELSEWHERE CLASSIFIED: ICD-10-CM

## 2021-04-15 PROCEDURE — 73610 X-RAY EXAM OF ANKLE: CPT | Mod: RT

## 2021-04-15 PROCEDURE — 4410556 CT-CARDIAC SCORING (SELF PAY ONLY)

## 2021-05-05 ENCOUNTER — HOSPITAL ENCOUNTER (OUTPATIENT)
Dept: RADIOLOGY | Facility: MEDICAL CENTER | Age: 66
End: 2021-05-05
Attending: FAMILY MEDICINE
Payer: COMMERCIAL

## 2021-05-05 DIAGNOSIS — Z87.39 PERSONAL HISTORY OF OSTEOPOROSIS: ICD-10-CM

## 2021-05-05 DIAGNOSIS — Z12.31 ENCOUNTER FOR SCREENING MAMMOGRAM FOR BREAST CANCER: ICD-10-CM

## 2021-05-05 PROCEDURE — 77063 BREAST TOMOSYNTHESIS BI: CPT

## 2021-05-05 PROCEDURE — 77080 DXA BONE DENSITY AXIAL: CPT

## 2021-05-10 ENCOUNTER — NON-PROVIDER VISIT (OUTPATIENT)
Dept: INTERNAL MEDICINE | Facility: IMAGING CENTER | Age: 66
End: 2021-05-10
Payer: COMMERCIAL

## 2021-05-10 ENCOUNTER — HOSPITAL ENCOUNTER (OUTPATIENT)
Facility: MEDICAL CENTER | Age: 66
End: 2021-05-10
Attending: FAMILY MEDICINE
Payer: COMMERCIAL

## 2021-05-10 DIAGNOSIS — Z00.00 LABORATORY EXAMINATION ORDERED AS PART OF A ROUTINE GENERAL MEDICAL EXAMINATION: ICD-10-CM

## 2021-05-10 LAB
ALBUMIN SERPL BCP-MCNC: 4.2 G/DL (ref 3.2–4.9)
ALBUMIN/GLOB SERPL: 1.4 G/DL
ALP SERPL-CCNC: 105 U/L (ref 30–99)
ALT SERPL-CCNC: 18 U/L (ref 2–50)
ANION GAP SERPL CALC-SCNC: 10 MMOL/L (ref 7–16)
AST SERPL-CCNC: 15 U/L (ref 12–45)
BASOPHILS # BLD AUTO: 1.1 % (ref 0–1.8)
BASOPHILS # BLD: 0.11 K/UL (ref 0–0.12)
BILIRUB SERPL-MCNC: 0.4 MG/DL (ref 0.1–1.5)
BUN SERPL-MCNC: 11 MG/DL (ref 8–22)
CALCIUM SERPL-MCNC: 9.5 MG/DL (ref 8.5–10.5)
CHLORIDE SERPL-SCNC: 107 MMOL/L (ref 96–112)
CHOLEST SERPL-MCNC: 191 MG/DL (ref 100–199)
CO2 SERPL-SCNC: 25 MMOL/L (ref 20–33)
CREAT SERPL-MCNC: 0.92 MG/DL (ref 0.5–1.4)
EOSINOPHIL # BLD AUTO: 0.26 K/UL (ref 0–0.51)
EOSINOPHIL NFR BLD: 2.6 % (ref 0–6.9)
ERYTHROCYTE [DISTWIDTH] IN BLOOD BY AUTOMATED COUNT: 49.3 FL (ref 35.9–50)
GLOBULIN SER CALC-MCNC: 3 G/DL (ref 1.9–3.5)
GLUCOSE SERPL-MCNC: 93 MG/DL (ref 65–99)
HCT VFR BLD AUTO: 50.6 % (ref 37–47)
HDLC SERPL-MCNC: 44 MG/DL
HGB BLD-MCNC: 16.6 G/DL (ref 12–16)
IMM GRANULOCYTES # BLD AUTO: 0.02 K/UL (ref 0–0.11)
IMM GRANULOCYTES NFR BLD AUTO: 0.2 % (ref 0–0.9)
LDLC SERPL CALC-MCNC: 110 MG/DL
LYMPHOCYTES # BLD AUTO: 3.72 K/UL (ref 1–4.8)
LYMPHOCYTES NFR BLD: 37.2 % (ref 22–41)
MCH RBC QN AUTO: 31.9 PG (ref 27–33)
MCHC RBC AUTO-ENTMCNC: 32.8 G/DL (ref 33.6–35)
MCV RBC AUTO: 97.1 FL (ref 81.4–97.8)
MONOCYTES # BLD AUTO: 0.53 K/UL (ref 0–0.85)
MONOCYTES NFR BLD AUTO: 5.3 % (ref 0–13.4)
NEUTROPHILS # BLD AUTO: 5.36 K/UL (ref 2–7.15)
NEUTROPHILS NFR BLD: 53.6 % (ref 44–72)
NRBC # BLD AUTO: 0 K/UL
NRBC BLD-RTO: 0 /100 WBC
PLATELET # BLD AUTO: 304 K/UL (ref 164–446)
PMV BLD AUTO: 11.9 FL (ref 9–12.9)
POTASSIUM SERPL-SCNC: 4.6 MMOL/L (ref 3.6–5.5)
PROT SERPL-MCNC: 7.2 G/DL (ref 6–8.2)
RBC # BLD AUTO: 5.21 M/UL (ref 4.2–5.4)
SODIUM SERPL-SCNC: 142 MMOL/L (ref 135–145)
TRIGL SERPL-MCNC: 184 MG/DL (ref 0–149)
TSH SERPL DL<=0.005 MIU/L-ACNC: 3.21 UIU/ML (ref 0.38–5.33)
WBC # BLD AUTO: 10 K/UL (ref 4.8–10.8)

## 2021-05-10 PROCEDURE — 80061 LIPID PANEL: CPT

## 2021-05-10 PROCEDURE — 85025 COMPLETE CBC W/AUTO DIFF WBC: CPT

## 2021-05-10 PROCEDURE — 84443 ASSAY THYROID STIM HORMONE: CPT

## 2021-05-10 PROCEDURE — 80053 COMPREHEN METABOLIC PANEL: CPT

## 2021-05-11 ENCOUNTER — HOSPITAL ENCOUNTER (OUTPATIENT)
Dept: RADIOLOGY | Facility: MEDICAL CENTER | Age: 66
End: 2021-05-11
Attending: FAMILY MEDICINE
Payer: COMMERCIAL

## 2021-05-11 DIAGNOSIS — R92.8 ABNORMAL MAMMOGRAM: ICD-10-CM

## 2021-05-11 PROCEDURE — G0279 TOMOSYNTHESIS, MAMMO: HCPCS | Mod: RT

## 2021-05-11 PROCEDURE — 76642 ULTRASOUND BREAST LIMITED: CPT | Mod: RT

## 2021-05-12 DIAGNOSIS — R92.8 ABNORMAL FINDINGS ON DIAGNOSTIC IMAGING OF BREAST: ICD-10-CM

## 2021-05-17 ENCOUNTER — OFFICE VISIT (OUTPATIENT)
Dept: INTERNAL MEDICINE | Facility: IMAGING CENTER | Age: 66
End: 2021-05-17
Payer: COMMERCIAL

## 2021-05-17 VITALS
BODY MASS INDEX: 26.46 KG/M2 | RESPIRATION RATE: 14 BRPM | TEMPERATURE: 97.5 F | HEIGHT: 64 IN | OXYGEN SATURATION: 97 % | DIASTOLIC BLOOD PRESSURE: 65 MMHG | HEART RATE: 76 BPM | WEIGHT: 155 LBS | SYSTOLIC BLOOD PRESSURE: 110 MMHG

## 2021-05-17 DIAGNOSIS — E55.9 VITAMIN D DEFICIENCY: ICD-10-CM

## 2021-05-17 DIAGNOSIS — R31.0 GROSS HEMATURIA: ICD-10-CM

## 2021-05-17 DIAGNOSIS — R92.8 ABNORMAL FINDINGS ON DIAGNOSTIC IMAGING OF BREAST: ICD-10-CM

## 2021-05-17 DIAGNOSIS — F17.200 TOBACCO DEPENDENCE: ICD-10-CM

## 2021-05-17 DIAGNOSIS — E78.2 MIXED HYPERLIPIDEMIA: ICD-10-CM

## 2021-05-17 DIAGNOSIS — Z00.00 WELLNESS EXAMINATION: ICD-10-CM

## 2021-05-17 DIAGNOSIS — M85.80 OSTEOPENIA, UNSPECIFIED LOCATION: ICD-10-CM

## 2021-05-17 PROCEDURE — 99396 PREV VISIT EST AGE 40-64: CPT | Mod: 25 | Performed by: FAMILY MEDICINE

## 2021-05-17 RX ORDER — CEFDINIR 300 MG/1
CAPSULE ORAL
COMMUNITY
End: 2022-09-30

## 2021-05-17 RX ORDER — NITROFURANTOIN 25; 75 MG/1; MG/1
CAPSULE ORAL
COMMUNITY
End: 2022-09-30

## 2021-05-17 ASSESSMENT — FIBROSIS 4 INDEX: FIB4 SCORE: 0.74

## 2021-05-17 NOTE — PROGRESS NOTES
Subjective:     CC: CPE    HPI:      Gabriel Gonzalez is a 65 y.o. female who presents for annual exam. She is feeling well    See prob list for updates      Post-menopausal bleeding: *no    Urinary incontinence:no       Health Maintenance     Osteoporosis Screen/ DEXA: 2021      Cholesterol Screening:  done  Diabetes Screening:done       Diet: regula  r Exercise: adequate       Substance Abuse: no             Cancer screening  Colorectal Cancer Screening: UTD     Cervical Cancer Screenin2020   Breast Cancer Screenin2021      Short-term follow-up ultrasound examination of the right breast in 2 months is recommended in order to monitor the dilated duct containing echogenic material.    six-month follow-up right breast mammography is recommended, pending findings on the follow-up ultrasound examination, for short-term follow-up of the asymmetry seen on mammography.        Infectious disease screening/Immunizations     --Immunizations: UTD          She  has a past medical history of Anxiety, Arthritis, Chronic back pain, Chronic neck pain, Heart burn, History of fracture (4/10/2014), and OSTEOPOROSIS.  She  has a past surgical history that includes lumpectomy (); ganglion excision (08); carpal tunnel endoscopic (08); breast biopsy (2009); colonoscopy (); cholecystectomy; us-needle core bx-breast panel (Left, ); and us-needle core bx-breast panel (Right, ).    Family History   Problem Relation Age of Onset   • Hypertension Mother    • Heart Disease Mother         atrial fib   • Diabetes Father    • Cancer Father         pancreatic   • Heart Disease Sister         valvular   • Heart Disease Brother         a fib   • Heart Disease Sister         sinus arrhythmia   • Diabetes Sister    • Hypertension Maternal Grandmother    • Stroke Maternal Grandmother        Social History     Socioeconomic History   • Marital status:      Spouse name: Not on file   • Number of  children: Not on file   • Years of education: Not on file   • Highest education level: Not on file   Occupational History   • Not on file   Tobacco Use   • Smoking status: Former Smoker     Packs/day: 0.50     Years: 15.00     Pack years: 7.50     Types: Cigarettes     Quit date: 10/1/2000     Years since quittin.6   • Smokeless tobacco: Never Used   Vaping Use   • Vaping Use: Never used   Substance and Sexual Activity   • Alcohol use: No   • Drug use: No   • Sexual activity: Not on file     Comment: , 1 child,    Other Topics Concern   • Not on file   Social History Narrative    Retired         Son      Social Determinants of Health     Financial Resource Strain:    • Difficulty of Paying Living Expenses:    Food Insecurity:    • Worried About Running Out of Food in the Last Year:    • Ran Out of Food in the Last Year:    Transportation Needs:    • Lack of Transportation (Medical):    • Lack of Transportation (Non-Medical):    Physical Activity: Sufficiently Active   • Days of Exercise per Week: 7 days   • Minutes of Exercise per Session: 90 min   Stress:    • Feeling of Stress :    Social Connections:    • Frequency of Communication with Friends and Family:    • Frequency of Social Gatherings with Friends and Family:    • Attends Buddhism Services:    • Active Member of Clubs or Organizations:    • Attends Club or Organization Meetings:    • Marital Status:    Intimate Partner Violence:    • Fear of Current or Ex-Partner:    • Emotionally Abused:    • Physically Abused:    • Sexually Abused:        Patient Active Problem List    Diagnosis Date Noted   • Mixed hyperlipidemia 2021   • Abnormal findings on diagnostic imaging of breast 2021   • Gross hematuria 2021   • Tobacco dependence 2019   • Vitamin D deficiency 2018   • History of fracture 04/10/2014   • Osteopenia 2011   • Anxiety    • Chronic neck pain    • Chronic back pain          Current  Outpatient Medications   Medication Sig Dispense Refill   • cefdinir (OMNICEF) 300 MG Cap cefdinir 300 mg capsule   TAKE 1 CAPSULE BY MOUTH TWICE A DAY     • nitrofurantoin (MACROBID) 100 MG Cap nitrofurantoin monohydrate/macrocrystals 100 mg capsule   TAKE 1 CAPSULE BY MOUTH TWICE A DAY     • vitamin D (CHOLECALCIFEROL) 1000 Unit (25 mcg) Tab Take 2,000 Units by mouth every day.       No current facility-administered medications for this visit.     Allergies   Allergen Reactions   • Nkda [No Known Drug Allergy]        Review of Systems    Constitutional: Negative for fever, chills and malaise/fatigue.   HENT: Negative for congestion.    Eyes: Negative for pain.    Respiratory: Negative for cough and shortness of breath.  Cardiovascular: Negative for leg swelling.   Gastrointestinal: Negative for nausea, vomiting, abdominal pain and diarrhea.   Genitourinary: Negative for dysuria  +bladder spasms  Skin: Negative for rash.   Neurological: Negative for dizziness, focal weakness and headaches.   Endo/Heme/Allergies: Does not bruise/bleed easily.   Psychiatric/Behavioral: Negative for depression.  The patient is not nervous/anxious.      Objective:     There were no vitals taken for this visit.  There is no height or weight on file to calculate BMI.  Wt Readings from Last 4 Encounters:   04/02/21 69.9 kg (154 lb)   02/20/20 68.9 kg (152 lb)   11/20/19 71.2 kg (157 lb)   11/11/19 71.2 kg (157 lb)       Physical Exam:  Constitutional: Well-developed and well-nourished. Not diaphoretic. No distress.   Skin: Skin is warm and dry. No rash noted.  Head: Atraumatic without lesions.  Eyes: Conjunctivae and extraocular motions are normal. Pupils are equal, round, and reactive to light. No scleral icterus.   Ears:  External ears unremarkable. Tympanic membranes clear and intact.  Nose: Nares patent. Septum midline. Turbinates without erythema nor edema. No discharge.   Mouth/Throat: Dentition is *. Tongue normal. Oropharynx is  clear and moist. Posterior pharynx without erythema or exudates.  Neck: Supple, trachea midline. Normal range of motion. No thyromegaly present. No lymphadenopathy--cervical or supraclavicular.  Cardiovascular: Regular rate and rhythm, S1 and S2 without murmur, rubs, or gallops.  Lungs: Normal inspiratory effort, CTA bilaterally, no wheezes/rhonchi/rales       Abdomen: Soft, non tender, and without distention. Active bowel sounds in all four quadrants. No rebound, guarding, masses or HSM.    Extremities: No cyanosis, clubbing, erythema, nor edema. Distal pulses intact and symmetric.   Musculoskeletal: All major joints AROM full in all directions without pain.  Neurological: Alert and oriented x 3. DTRs 2+/3 and symmetric. No cranial nerve deficit. 5/5 myotomes. Sensation intact.   Psychiatric:  Behavior, mood, and affect are appropriate.       Assessment and Plan:     1. Wellness examination     2. Tobacco dependence   see problem list for updates     3. Gross hematuria   see problem list for updates   4. Mixed hyperlipidemia  .check lipid panel       HCM:    Labs per orders  Immunizations per orders  Patient counseled about skin care, diet, supplements, prenatal vitamins, safe sex and exercise.      Follow-up:  Annually and prn

## 2021-05-17 NOTE — ASSESSMENT & PLAN NOTE
DEXA scan this month showed osteopenia discussed vitamin D3 supplementation and getting calcium from dietary sources enough

## 2021-05-17 NOTE — ASSESSMENT & PLAN NOTE
3 months trial of increased exercise and healthier diet  Recheck lipid panel then  Discussed possible statin

## 2021-07-08 ENCOUNTER — HOSPITAL ENCOUNTER (OUTPATIENT)
Dept: RADIOLOGY | Facility: MEDICAL CENTER | Age: 66
End: 2021-07-08
Attending: UROLOGY
Payer: COMMERCIAL

## 2021-07-08 DIAGNOSIS — R31.0 GROSS HEMATURIA: ICD-10-CM

## 2021-07-08 PROCEDURE — 700117 HCHG RX CONTRAST REV CODE 255

## 2021-07-08 PROCEDURE — 74178 CT ABD&PLV WO CNTR FLWD CNTR: CPT

## 2021-07-08 RX ADMIN — IOHEXOL 100 ML: 350 INJECTION, SOLUTION INTRAVENOUS at 09:15

## 2021-09-10 ENCOUNTER — HOSPITAL ENCOUNTER (OUTPATIENT)
Facility: MEDICAL CENTER | Age: 66
End: 2021-09-10
Attending: FAMILY MEDICINE
Payer: COMMERCIAL

## 2021-09-10 ENCOUNTER — NON-PROVIDER VISIT (OUTPATIENT)
Dept: INTERNAL MEDICINE | Facility: IMAGING CENTER | Age: 66
End: 2021-09-10
Payer: COMMERCIAL

## 2021-09-10 DIAGNOSIS — R30.0 DYSURIA: ICD-10-CM

## 2021-09-10 DIAGNOSIS — R35.0 URINARY FREQUENCY: ICD-10-CM

## 2021-09-10 LAB
APPEARANCE UR: NORMAL
BILIRUB UR STRIP-MCNC: NORMAL MG/DL
COLOR UR AUTO: YELLOW
GLUCOSE UR STRIP.AUTO-MCNC: NEGATIVE MG/DL
KETONES UR STRIP.AUTO-MCNC: NEGATIVE MG/DL
LEUKOCYTE ESTERASE UR QL STRIP.AUTO: NORMAL
NITRITE UR QL STRIP.AUTO: NEGATIVE
PH UR STRIP.AUTO: 6 [PH] (ref 5–8)
PROT UR QL STRIP: 100 MG/DL
RBC UR QL AUTO: NORMAL
SP GR UR STRIP.AUTO: >1.03
UROBILINOGEN UR STRIP-MCNC: 0.2 MG/DL

## 2021-09-10 PROCEDURE — 81002 URINALYSIS NONAUTO W/O SCOPE: CPT | Performed by: FAMILY MEDICINE

## 2021-09-10 PROCEDURE — 87077 CULTURE AEROBIC IDENTIFY: CPT

## 2021-09-10 PROCEDURE — 87086 URINE CULTURE/COLONY COUNT: CPT

## 2021-09-10 RX ORDER — NITROFURANTOIN 25; 75 MG/1; MG/1
100 CAPSULE ORAL 2 TIMES DAILY
Qty: 6 CAPSULE | Refills: 0 | Status: SHIPPED | OUTPATIENT
Start: 2021-09-10 | End: 2021-09-13

## 2021-09-11 DIAGNOSIS — R30.0 DYSURIA: ICD-10-CM

## 2021-09-11 DIAGNOSIS — R35.0 URINARY FREQUENCY: ICD-10-CM

## 2021-09-13 LAB
BACTERIA UR CULT: NORMAL
SIGNIFICANT IND 70042: NORMAL
SITE SITE: NORMAL
SOURCE SOURCE: NORMAL

## 2021-10-07 ENCOUNTER — HOSPITAL ENCOUNTER (OUTPATIENT)
Facility: MEDICAL CENTER | Age: 66
End: 2021-10-07
Attending: FAMILY MEDICINE
Payer: COMMERCIAL

## 2021-10-07 ENCOUNTER — NON-PROVIDER VISIT (OUTPATIENT)
Dept: INTERNAL MEDICINE | Facility: IMAGING CENTER | Age: 66
End: 2021-10-07
Payer: COMMERCIAL

## 2021-10-07 DIAGNOSIS — E78.2 MIXED HYPERLIPIDEMIA: ICD-10-CM

## 2021-10-07 DIAGNOSIS — M85.80 OSTEOPENIA, UNSPECIFIED LOCATION: ICD-10-CM

## 2021-10-07 LAB
25(OH)D3 SERPL-MCNC: 34 NG/ML (ref 30–100)
CHOLEST SERPL-MCNC: 156 MG/DL (ref 100–199)
HDLC SERPL-MCNC: 40 MG/DL
LDLC SERPL CALC-MCNC: 88 MG/DL
TRIGL SERPL-MCNC: 139 MG/DL (ref 0–149)

## 2021-10-07 PROCEDURE — 80061 LIPID PANEL: CPT

## 2021-10-07 PROCEDURE — 82306 VITAMIN D 25 HYDROXY: CPT

## 2021-10-14 ENCOUNTER — OFFICE VISIT (OUTPATIENT)
Dept: INTERNAL MEDICINE | Facility: IMAGING CENTER | Age: 66
End: 2021-10-14
Payer: COMMERCIAL

## 2021-10-14 VITALS
OXYGEN SATURATION: 98 % | BODY MASS INDEX: 25.1 KG/M2 | DIASTOLIC BLOOD PRESSURE: 72 MMHG | RESPIRATION RATE: 14 BRPM | TEMPERATURE: 97 F | SYSTOLIC BLOOD PRESSURE: 126 MMHG | HEART RATE: 78 BPM | HEIGHT: 64 IN | WEIGHT: 147 LBS

## 2021-10-14 DIAGNOSIS — E55.9 VITAMIN D DEFICIENCY: ICD-10-CM

## 2021-10-14 DIAGNOSIS — F17.200 TOBACCO DEPENDENCE: ICD-10-CM

## 2021-10-14 DIAGNOSIS — Z23 NEED FOR VACCINATION: ICD-10-CM

## 2021-10-14 DIAGNOSIS — K57.90 DIVERTICULOSIS: ICD-10-CM

## 2021-10-14 DIAGNOSIS — E78.2 MIXED HYPERLIPIDEMIA: ICD-10-CM

## 2021-10-14 PROCEDURE — 90662 IIV NO PRSV INCREASED AG IM: CPT | Performed by: FAMILY MEDICINE

## 2021-10-14 PROCEDURE — 99215 OFFICE O/P EST HI 40 MIN: CPT | Mod: 25 | Performed by: FAMILY MEDICINE

## 2021-10-14 PROCEDURE — 90471 IMMUNIZATION ADMIN: CPT | Performed by: FAMILY MEDICINE

## 2021-10-14 ASSESSMENT — FIBROSIS 4 INDEX: FIB4 SCORE: 0.74

## 2021-10-14 NOTE — PROGRESS NOTES
"No chief complaint on file.      Subjective:     HPI:   Tony Gonzalez is a 65 y.o. female here to discuss the evaluation and management of:    1.  CT scan results-abdominal CT was ordered by urology for hematuria that she was having  She noticed that the scan showed diverticulosis  She does report that at times she will have left lower quadrant pain that can be sharp.  She is never formally been diagnosed with diverticulitis    She had a colonoscopy 2 years ago which was otherwise normal    2.  Cholesterol-in May she had elevated LDL and triglycerides  She improved her diet cutting out additional saturated fat and retested      3.  Smoking-she has committed to not buying any cigarettes  And as her relatives moved out of the house who had cigarettes she is no longer going to smoke    4.  With a change in her diet she has lost a few extra pounds and she feels better       No problems updated.     Objective:     /72   Pulse 78   Temp 36.1 °C (97 °F) (Temporal)   Resp 14   Ht 1.626 m (5' 4.02\")   Wt 66.7 kg (147 lb)   SpO2 98%  Body mass index is 25.22 kg/m².    Physical Exam:  Physical Exam  Constitutional: Well-developed and well-nourished. Not diaphoretic. No distress.   Skin: Skin is warm and dry. No rash noted.  Head: Atraumatic without lesions.  Eyes: Conjunctivae and extraocular motions are normal.   Ears:  External ears unremarkable.    Nose: Nares patent. Mucosa without edema or erythema. No discharge. No facial tenderness.     Neck: Supple, No thyromegaly present. No JVD  Cardiovascular: Regular rate and rhythm.   Chest: Effort normal. Clear to auscultation throughout. No adventitious sounds.   Abdomen:  without distention.  .  Extremities: No cyanosis, clubbing, erythema, nor edema.   Neurological: Alert and oriented x 3.    Psychiatric:  Behavior, mood, and affect are appropriate   Results for TONY GONZALEZ (MRN [id number]) as of 10/14/2021 12:53   Ref. Range 10/7/2021 08:30 "   Cholesterol,Tot Latest Ref Range: 100 - 199 mg/dL 156   Triglycerides Latest Ref Range: 0 - 149 mg/dL 139   HDL Latest Ref Range: >=40 mg/dL 40   LDL Latest Ref Range: <100 mg/dL 88   25-Hydroxy   Vitamin D 25 Latest Ref Range: 30 - 100 ng/mL 34        The 10-year ASCVD risk score (Debbie BADILLO Jr., et al., 2013) is: 3.8%    Values used to calculate the score:      Age: 65 years      Sex: Female      Is Non- : No      Diabetic: No      Tobacco smoker: No      Systolic Blood Pressure: 110 mmHg      Is BP treated: No      HDL Cholesterol: 40 mg/dL      Total Cholesterol: 156 mg/dL  Assessment and Plan:     The following treatment plan was discussed:      1. Mixed hyperlipidemia   applauded dietary changes, discussed exercise and weight loss    Encouraged continued success   2. Need for vaccination  INFLUENZA VACCINE, HIGH DOSE (65+ ONLY)   3. Tobacco dependence   discussed and recommended cessation   4. Diverticulosis -reviewed CT scan  lengthy discussion regarding typical symptoms of diverticulitis and typical dietary treatment as well as antibiotics  Also discussed indications for going to the ER   5. Vitamin D deficiency   stable lab results          Any change or worsening of signs or symptoms, patient encouraged to follow-up or report to emergency room for further evaluation. Patient verbalizes understanding and agrees.    Follow-Up: No follow-ups on file.      PLEASE NOTE: This dictation was created using voice recognition software. I have made every reasonable attempt to correct obvious errors, but I expect that there are errors of grammar and possibly content that I did not discover before finalizing the note.    My total time spent caring for the patient on the day of the encounter was  greater than 40 minutes.   This includes obtaining history, reviewing chart, physical exam, patient education, reviewing outside records, placing orders, interpreting tests and coordinating care.

## 2022-01-06 ENCOUNTER — HOSPITAL ENCOUNTER (OUTPATIENT)
Dept: RADIOLOGY | Facility: MEDICAL CENTER | Age: 67
End: 2022-01-06
Attending: FAMILY MEDICINE
Payer: MEDICARE

## 2022-01-06 DIAGNOSIS — R92.8 ABNORMAL FINDINGS ON DIAGNOSTIC IMAGING OF BREAST: ICD-10-CM

## 2022-01-06 PROCEDURE — G0279 TOMOSYNTHESIS, MAMMO: HCPCS | Mod: RT

## 2022-01-06 PROCEDURE — 76642 ULTRASOUND BREAST LIMITED: CPT | Mod: RT

## 2022-07-26 ENCOUNTER — HOSPITAL ENCOUNTER (OUTPATIENT)
Dept: RADIOLOGY | Facility: MEDICAL CENTER | Age: 67
End: 2022-07-26
Attending: FAMILY MEDICINE
Payer: MEDICARE

## 2022-07-26 ENCOUNTER — HOSPITAL ENCOUNTER (OUTPATIENT)
Dept: RADIOLOGY | Facility: MEDICAL CENTER | Age: 67
End: 2022-07-26
Attending: FAMILY MEDICINE

## 2022-07-26 DIAGNOSIS — R92.8 ABNORMAL MAMMOGRAM: ICD-10-CM

## 2022-07-26 PROCEDURE — 76642 ULTRASOUND BREAST LIMITED: CPT | Mod: RT

## 2022-07-26 PROCEDURE — G0279 TOMOSYNTHESIS, MAMMO: HCPCS

## 2022-09-22 ENCOUNTER — HOSPITAL ENCOUNTER (OUTPATIENT)
Facility: MEDICAL CENTER | Age: 67
End: 2022-09-22
Attending: FAMILY MEDICINE

## 2022-09-22 ENCOUNTER — NON-PROVIDER VISIT (OUTPATIENT)
Dept: INTERNAL MEDICINE | Facility: IMAGING CENTER | Age: 67
End: 2022-09-22
Payer: MEDICARE

## 2022-09-22 DIAGNOSIS — Z00.00 LABORATORY EXAMINATION ORDERED AS PART OF A ROUTINE GENERAL MEDICAL EXAMINATION: ICD-10-CM

## 2022-09-22 DIAGNOSIS — E78.2 MIXED HYPERLIPIDEMIA: ICD-10-CM

## 2022-09-22 DIAGNOSIS — E55.9 VITAMIN D DEFICIENCY: ICD-10-CM

## 2022-09-22 DIAGNOSIS — R31.9 HEMATURIA, UNSPECIFIED TYPE: ICD-10-CM

## 2022-09-22 LAB
25(OH)D3 SERPL-MCNC: 31 NG/ML (ref 30–100)
ALBUMIN SERPL BCP-MCNC: 4.4 G/DL (ref 3.2–4.9)
ALBUMIN/GLOB SERPL: 1.7 G/DL
ALP SERPL-CCNC: 90 U/L (ref 30–99)
ALT SERPL-CCNC: 15 U/L (ref 2–50)
ANION GAP SERPL CALC-SCNC: 9 MMOL/L (ref 7–16)
AST SERPL-CCNC: 15 U/L (ref 12–45)
BASOPHILS # BLD AUTO: 0.9 % (ref 0–1.8)
BASOPHILS # BLD: 0.09 K/UL (ref 0–0.12)
BILIRUB SERPL-MCNC: 0.3 MG/DL (ref 0.1–1.5)
BUN SERPL-MCNC: 10 MG/DL (ref 8–22)
CALCIUM SERPL-MCNC: 9.3 MG/DL (ref 8.5–10.5)
CHLORIDE SERPL-SCNC: 109 MMOL/L (ref 96–112)
CHOLEST SERPL-MCNC: 161 MG/DL (ref 100–199)
CO2 SERPL-SCNC: 25 MMOL/L (ref 20–33)
CREAT SERPL-MCNC: 0.86 MG/DL (ref 0.5–1.4)
EOSINOPHIL # BLD AUTO: 0.26 K/UL (ref 0–0.51)
EOSINOPHIL NFR BLD: 2.6 % (ref 0–6.9)
ERYTHROCYTE [DISTWIDTH] IN BLOOD BY AUTOMATED COUNT: 47.2 FL (ref 35.9–50)
GFR SERPLBLD CREATININE-BSD FMLA CKD-EPI: 75 ML/MIN/1.73 M 2
GLOBULIN SER CALC-MCNC: 2.6 G/DL (ref 1.9–3.5)
GLUCOSE SERPL-MCNC: 95 MG/DL (ref 65–99)
HCT VFR BLD AUTO: 48.5 % (ref 37–47)
HDLC SERPL-MCNC: 45 MG/DL
HGB BLD-MCNC: 16.2 G/DL (ref 12–16)
IMM GRANULOCYTES # BLD AUTO: 0.04 K/UL (ref 0–0.11)
IMM GRANULOCYTES NFR BLD AUTO: 0.4 % (ref 0–0.9)
LDLC SERPL CALC-MCNC: 95 MG/DL
LYMPHOCYTES # BLD AUTO: 3.58 K/UL (ref 1–4.8)
LYMPHOCYTES NFR BLD: 35.8 % (ref 22–41)
MCH RBC QN AUTO: 31.9 PG (ref 27–33)
MCHC RBC AUTO-ENTMCNC: 33.4 G/DL (ref 33.6–35)
MCV RBC AUTO: 95.5 FL (ref 81.4–97.8)
MONOCYTES # BLD AUTO: 0.6 K/UL (ref 0–0.85)
MONOCYTES NFR BLD AUTO: 6 % (ref 0–13.4)
NEUTROPHILS # BLD AUTO: 5.43 K/UL (ref 2–7.15)
NEUTROPHILS NFR BLD: 54.3 % (ref 44–72)
NRBC # BLD AUTO: 0 K/UL
NRBC BLD-RTO: 0 /100 WBC
PLATELET # BLD AUTO: 256 K/UL (ref 164–446)
PMV BLD AUTO: 12.1 FL (ref 9–12.9)
POTASSIUM SERPL-SCNC: 4.4 MMOL/L (ref 3.6–5.5)
PROT SERPL-MCNC: 7 G/DL (ref 6–8.2)
RBC # BLD AUTO: 5.08 M/UL (ref 4.2–5.4)
SODIUM SERPL-SCNC: 143 MMOL/L (ref 135–145)
TRIGL SERPL-MCNC: 106 MG/DL (ref 0–149)
TSH SERPL DL<=0.005 MIU/L-ACNC: 3.48 UIU/ML (ref 0.38–5.33)
WBC # BLD AUTO: 10 K/UL (ref 4.8–10.8)

## 2022-09-22 PROCEDURE — 80053 COMPREHEN METABOLIC PANEL: CPT

## 2022-09-22 PROCEDURE — 82306 VITAMIN D 25 HYDROXY: CPT

## 2022-09-22 PROCEDURE — 80061 LIPID PANEL: CPT

## 2022-09-22 PROCEDURE — 85025 COMPLETE CBC W/AUTO DIFF WBC: CPT

## 2022-09-22 PROCEDURE — 84443 ASSAY THYROID STIM HORMONE: CPT

## 2022-09-30 ENCOUNTER — OFFICE VISIT (OUTPATIENT)
Dept: INTERNAL MEDICINE | Facility: IMAGING CENTER | Age: 67
End: 2022-09-30
Payer: MEDICARE

## 2022-09-30 VITALS
DIASTOLIC BLOOD PRESSURE: 74 MMHG | SYSTOLIC BLOOD PRESSURE: 136 MMHG | RESPIRATION RATE: 14 BRPM | BODY MASS INDEX: 25.27 KG/M2 | TEMPERATURE: 98 F | HEART RATE: 86 BPM | WEIGHT: 148 LBS | OXYGEN SATURATION: 96 % | HEIGHT: 64 IN

## 2022-09-30 DIAGNOSIS — Z00.00 MEDICARE ANNUAL WELLNESS VISIT, SUBSEQUENT: Primary | ICD-10-CM

## 2022-09-30 DIAGNOSIS — R03.0 ELEVATED BP WITHOUT DIAGNOSIS OF HYPERTENSION: ICD-10-CM

## 2022-09-30 PROCEDURE — G0402 INITIAL PREVENTIVE EXAM: HCPCS | Performed by: FAMILY MEDICINE

## 2022-09-30 ASSESSMENT — ENCOUNTER SYMPTOMS: GENERAL WELL-BEING: GOOD

## 2022-09-30 ASSESSMENT — ACTIVITIES OF DAILY LIVING (ADL): BATHING_REQUIRES_ASSISTANCE: 0

## 2022-09-30 ASSESSMENT — PATIENT HEALTH QUESTIONNAIRE - PHQ9: CLINICAL INTERPRETATION OF PHQ2 SCORE: 0

## 2022-09-30 ASSESSMENT — FIBROSIS 4 INDEX: FIB4 SCORE: 0.98

## 2022-09-30 NOTE — PATIENT INSTRUCTIONS
Advance Directive    Advance directives are legal documents that let you make choices ahead of time about your health care and medical treatment in case you become unable to communicate for yourself. Advance directives are a way for you to communicate your wishes to family, friends, and health care providers. This can help convey your decisions about end-of-life care if you become unable to communicate.  Discussing and writing advance directives should happen over time rather than all at once. Advance directives can be changed depending on your situation and what you want, even after you have signed the advance directives.  If you do not have an advance directive, some states assign family decision makers to act on your behalf based on how closely you are related to them. Each state has its own laws regarding advance directives. You may want to check with your health care provider, , or state representative about the laws in your state. There are different types of advance directives, such as:  Medical power of .  Living will.  Do not resuscitate (DNR) or do not attempt resuscitation (DNAR) order.  Health care proxy and medical power of   A health care proxy, also called a health care agent, is a person who is appointed to make medical decisions for you in cases in which you are unable to make the decisions yourself. Generally, people choose someone they know well and trust to represent their preferences. Make sure to ask this person for an agreement to act as your proxy. A proxy may have to exercise judgment in the event of a medical decision for which your wishes are not known.  A medical power of  is a legal document that names your health care proxy. Depending on the laws in your state, after the document is written, it may also need to be:  Signed.  Notarized.  Dated.  Copied.  Witnessed.  Incorporated into your medical record.  You may also want to appoint someone to manage your  financial affairs in a situation in which you are unable to do so. This is called a durable power of  for finances. It is a separate legal document from the durable power of  for health care. You may choose the same person or someone different from your health care proxy to act as your agent in financial matters.  If you do not appoint a proxy, or if there is a concern that the proxy is not acting in your best interests, a court-appointed guardian may be designated to act on your behalf.  Living will  A living will is a set of instructions documenting your wishes about medical care when you cannot express them yourself. Health care providers should keep a copy of your living will in your medical record. You may want to give a copy to family members or friends. To alert caregivers in case of an emergency, you can place a card in your wallet to let them know that you have a living will and where they can find it. A living will is used if you become:  Terminally ill.  Incapacitated.  Unable to communicate or make decisions.  Items to consider in your living will include:  The use or non-use of life-sustaining equipment, such as dialysis machines and breathing machines (ventilators).  A DNR or DNAR order, which is the instruction not to use cardiopulmonary resuscitation (CPR) if breathing or heartbeat stops.  The use or non-use of tube feeding.  Withholding of food and fluids.  Comfort (palliative) care when the goal becomes comfort rather than a cure.  Organ and tissue donation.  A living will does not give instructions for distributing your money and property if you should pass away. It is recommended that you seek the advice of a  when writing a will. Decisions about taxes, beneficiaries, and asset distribution will be legally binding. This process can relieve your family and friends of any concerns surrounding disputes or questions that may come up about the distribution of your assets.  DNR or  DNAR  A DNR or DNAR order is a request not to have CPR in the event that your heart stops beating or you stop breathing. If a DNR or DNAR order has not been made and shared, a health care provider will try to help any patient whose heart has stopped or who has stopped breathing. If you plan to have surgery, talk with your health care provider about how your DNR or DNAR order will be followed if problems occur.  Summary  Advance directives are the legal documents that allow you to make choices ahead of time about your health care and medical treatment in case you become unable to communicate for yourself.  The process of discussing and writing advance directives should happen over time. You can change the advance directives, even after you have signed them.  Advance directives include DNR or DNAR orders, living nassar, and designating an agent as your medical power of .  This information is not intended to replace advice given to you by your health care provider. Make sure you discuss any questions you have with your health care provider.  Document Released: 03/26/2009 Document Revised: 01/22/2020 Document Reviewed: 11/06/2017  Elsevier Patient Education © 2020 [employer]

## 2022-09-30 NOTE — PROGRESS NOTES
Chief Complaint   Patient presents with    Medicare Annual Wellness         HPI:  Gabriel Gonzalez is a 66 y.o. here for Medicare Annual Wellness Visit     She is very active, she walks her dog for an hour a day  She rarely smokes anymore approximately 1 cigarette/month    She smoked for approximately 15 years off-and-on half a pack per day  She does not drink alcohol  She does self breast exams frequently  She takes vitamin D3 regularly      Patient Active Problem List    Diagnosis Date Noted    Mixed hyperlipidemia 05/17/2021    Abnormal findings on diagnostic imaging of breast 05/12/2021    Gross hematuria 04/02/2021    Tobacco dependence 11/11/2019    Vitamin D deficiency 05/29/2018    History of fracture 04/10/2014    Osteopenia 08/16/2011    Anxiety     Chronic neck pain     Chronic back pain        Current Outpatient Medications   Medication Sig Dispense Refill    cefdinir (OMNICEF) 300 MG Cap cefdinir 300 mg capsule   TAKE 1 CAPSULE BY MOUTH TWICE A DAY      nitrofurantoin (MACROBID) 100 MG Cap nitrofurantoin monohydrate/macrocrystals 100 mg capsule   TAKE 1 CAPSULE BY MOUTH TWICE A DAY      vitamin D (CHOLECALCIFEROL) 1000 Unit (25 mcg) Tab Take 2,000 Units by mouth every day.       No current facility-administered medications for this visit.            Current supplements as per medication list.       Allergies: Nkda [no known drug allergy]    Current social contact/activities: friends/family       She  reports that she quit smoking about 22 years ago. Her smoking use included cigarettes. She has a 7.50 pack-year smoking history. She has never used smokeless tobacco. She reports that she does not drink alcohol and does not use drugs.  Counseling given: Not Answered        DPA/Advanced Directive:  Patient does not have an Advanced Directive.  A packet and workshop information was given on Advanced Directives.      ROS:    Gait: Uses no assistive device   Ostomy: no   Other tubes: no   Amputations: no    Chronic oxygen use: no   Last eye exam:UTD /watching cataracts  Hearing is fine  : Denies any urinary leakage during the last 6 months incontinence.           Health Maintenance Summary            Overdue - IMM HEP B VACCINE (1 of 3 - Risk 3-dose series) Overdue - never done      No completion history exists for this topic.              Overdue - IMM PNEUMOCOCCAL VACCINE: 65+ Years (1 - PCV) Overdue - never done      No completion history exists for this topic.              Overdue - COVID-19 Vaccine (4 - Booster for Moderna series) Overdue since 3/27/2022      11/27/2021  Imm Admin: MODERNA SARS-COV-2 VACCINE (12+)    04/26/2021  Imm Admin: Moderna SARS-CoV-2 Vaccine    03/25/2021  Imm Admin: Moderna SARS-CoV-2 Vaccine              Overdue - IMM INFLUENZA (1) Overdue since 9/1/2022      10/14/2021  Imm Admin: Influenza Vaccine Adult HD    11/11/2019  Imm Admin: Influenza Vaccine Quad Inj (Pf)              PAP SMEAR (Every 3 Years) Next due on 2/20/2023 02/20/2020  Pathology Gynecology Specimen    01/04/2018  PATHOLOGY GYN SPECIMEN    04/10/2014  PAP IG, RFX HPV ASCU    01/11/2013  PAP IG, RFX HPV ASCU    08/16/2011  PAP IG, RFX HPV ASCU    Only the first 5 history entries have been loaded, but more history exists.              MAMMOGRAM (Yearly) Next due on 7/26/2023 07/26/2022  MA-DIAGNOSTIC MAMMO BILAT W/TOMOSYNTHESIS W/CAD    01/06/2022  MA-DIAGNOSTIC MAMMO RIGHT W/TOMOSYNTHESIS W/CAD    05/11/2021  MA-DIAGNOSTIC MAMMO RIGHT W/TOMOSYNTHESIS W/O CAD    05/05/2021  MA-SCREENING MAMMO BILAT W/TOMOSYNTHESIS W/CAD    12/23/2019  MA-SCREENING MAMMO BILAT W/TOMOSYNTHESIS W/CAD    Only the first 5 history entries have been loaded, but more history exists.              BONE DENSITY (Every 5 Years) Next due on 5/5/2026 05/05/2021  DS-BONE DENSITY STUDY (DEXA)    02/05/2018  DS-BONE DENSITY STUDY (DEXA)    03/17/2008  DS-BONE DENSITY STUDY (DEXA)              COLORECTAL CANCER SCREENING (COLONOSCOPY -  Every 10 Years) Next due on 10/18/2028      10/18/2018  REFERRAL TO GI FOR COLONOSCOPY              IMM DTaP/Tdap/Td Vaccine (2 - Td or Tdap) Next due on 2020  Imm Admin: Tdap Vaccine    2008  Imm Admin: TD Vaccine              HEPATITIS C SCREENING  Completed      2018  HEP C VIRUS ANTIBODY              IMM ZOSTER VACCINES (Series Information) Completed      2020  Imm Admin: Zoster Vaccine Recombinant (RZV) (SHINGRIX)    2020  Imm Admin: Zoster Vaccine Recombinant (RZV) (SHINGRIX)              IMM MENINGOCOCCAL ACWY VACCINE (Series Information) Aged Out      No completion history exists for this topic.                    Patient Care Team:  Tequila Saini M.D. as PCP - General (Family Medicine)  Connie Hoang R.N. as Registered Nurse      Social History     Tobacco Use    Smoking status: Former     Packs/day: 0.50     Years: 15.00     Pack years: 7.50     Types: Cigarettes     Quit date: 10/1/2000     Years since quittin.0    Smokeless tobacco: Never   Vaping Use    Vaping Use: Never used   Substance Use Topics    Alcohol use: No    Drug use: No     Family History   Problem Relation Age of Onset    Hypertension Mother     Heart Disease Mother         atrial fib    Diabetes Father     Cancer Father         pancreatic    Heart Disease Sister         valvular    Heart Disease Brother         a fib    Heart Disease Sister         sinus arrhythmia    Diabetes Sister     Hypertension Maternal Grandmother     Stroke Maternal Grandmother      She  has a past medical history of Anxiety, Arthritis, Chronic back pain, Chronic neck pain, Heart burn, History of fracture (4/10/2014), and OSTEOPOROSIS.   Past Surgical History:   Procedure Laterality Date    US-NEEDLE CORE BX-BREAST PANEL Right 2011    benign    BREAST BIOPSY  2009    Performed by JENNIFER GALAN at SURGERY SAME DAY [facility] ORS    GANGLION EXCISION  08    Performed by INGRID OVIEDO at  "SURGERY [facility] ORS    CARPAL TUNNEL ENDOSCOPIC  9/26/08    Performed by INGRID OVIEDO at SURGERY [facility]    COLONOSCOPY  2008    recheck 7 years    US-NEEDLE CORE BX-BREAST PANEL Left 1973    benign    LUMPECTOMY  1972    LEFT BREAST    CHOLECYSTECTOMY         Exam:     /74   Pulse 86   Temp 36.7 °C (98 °F)   Resp 14   Ht 1.626 m (5' 4.02\")   Wt 67.1 kg (148 lb)   SpO2 96%  Body mass index is 25.39 kg/m².    Hearing good.    Dentition good  Alert, oriented in no acute distress.  Eye contact is good, speech goal directed, affect calm             Physical Exam  Constitutional:       General: She is not in acute distress.     Appearance: Normal appearance.   HENT:      Head: Normocephalic and atraumatic.      Nose: Nose normal.      Mouth/Throat:      Mouth: Mucous membranes are moist.   Eyes:      Conjunctiva/sclera: Conjunctivae normal.   Cardiovascular:      Rate and Rhythm: Normal rate and regular rhythm.   Pulmonary:      Effort: Pulmonary effort is normal.      Breath sounds: Normal breath sounds. No wheezing.   Abdominal:      General: Abdomen is flat.      Palpations: Abdomen is soft.   Musculoskeletal:      Cervical back: No rigidity.      Right lower leg: No edema.      Left lower leg: No edema.   Lymphadenopathy:      Cervical: No cervical adenopathy.   Skin:     Coloration: Skin is not jaundiced.      Findings: No erythema.   Neurological:      General: No focal deficit present.      Mental Status: She is alert and oriented to person, place, and time. Mental status is at baseline.   Psychiatric:         Mood and Affect: Mood normal.         Behavior: Behavior normal.         Thought Content: Thought content normal.         Judgment: Judgment normal.      Assessment and Plan. The following treatment and monitoring plan is recommended:     1. Medicare annual wellness visit, subsequent        2. Elevated BP without diagnosis of hypertension        Recommend checking blood " pressure over the next 2 weeks several times and sending me a log  Most likely she was rushing on that is why it was elevated today (as she just got back from a camping trip today)      Services suggested: No services needed at this time  Health Care Screening: Age-appropriate preventive services Medicare covers discussed today and ordered if indicated.  Referrals offered: Community-based lifestyle interventions to reduce health risks and promote self-management and wellness, fall prevention, nutrition, physical activity, tobacco-use cessation, weight loss, and mental health services as per orders if indicated.    Discussion today about general wellness and lifestyle habits:    Prevent falls and reduce trip hazards; Cautioned about securing or removing rugs.  Have a working fire alarm and carbon monoxide detector;   Engage in regular physical activity and social activities       She  reports that she quit smoking about 22 years ago. Her smoking use included cigarettes. She has a 7.50 pack-year smoking history. She has never used smokeless tobacco. She reports that she does not drink alcohol and does not use drugs.  Counseling given: Not Answered       Depression Screening  Little interest or pleasure in doing things?  0 - not at all  Feeling down, depressed , or hopeless? 0 - not at all  Patient Health Questionnaire Score: 0     If depressive symptoms identified deferred to follow up visit unless specifically addressed in assessment and plan.    Interpretation of PHQ-9 Total Score   Score Severity   1-4 No Depression   5-9 Mild Depression   10-14 Moderate Depression   15-19 Moderately Severe Depression   20-27 Severe Depression    Screening for Cognitive Impairment  Three Minute Recall (daughter, heaven, mountain) 3/3    Sunil clock face with all 12 numbers and set the hands to show 10 past 11.  Yes    Cognitive concerns identified deferred for follow up unless specifically addressed in assessment and plan.    Fall  Risk Assessment  Has the patient had two or more falls in the last year or any fall with injury in the last year?  No    Safety Assessment  Throw rugs on floor.  Yes  Handrails on all stairs.  Yes  Good lighting in all hallways.  Yes  Difficulty hearing.  No  Patient counseled about all safety risks that were identified.    Functional Assessment ADLs  Are there any barriers preventing you from cooking for yourself or meeting nutritional needs?  No.    Are there any barriers preventing you from driving safely or obtaining transportation?  No.    Are there any barriers preventing you from using a telephone or calling for help?  No.    Are there any barriers preventing you from shopping?  No.    Are there any barriers preventing you from taking care of your own finances?  No.    Are there any barriers preventing you from managing your medications?  No.    Are there any barriers preventing you from showering, bathing or dressing yourself?  No.    Are you currently engaging in any exercise or physical activity?  Yes.     What is your perception of your health?  Good.    Advance Care Planning  Do you have an Advance Directive, Living Will, Durable Power of , or POLST? No.

## 2022-11-11 ENCOUNTER — PATIENT MESSAGE (OUTPATIENT)
Dept: HEALTH INFORMATION MANAGEMENT | Facility: OTHER | Age: 67
End: 2022-11-11

## 2022-11-15 ENCOUNTER — HOSPITAL ENCOUNTER (OUTPATIENT)
Dept: RADIOLOGY | Facility: MEDICAL CENTER | Age: 67
End: 2022-11-15
Attending: FAMILY MEDICINE
Payer: MEDICARE

## 2022-11-15 ENCOUNTER — OFFICE VISIT (OUTPATIENT)
Dept: INTERNAL MEDICINE | Facility: IMAGING CENTER | Age: 67
End: 2022-11-15
Payer: MEDICARE

## 2022-11-15 VITALS
RESPIRATION RATE: 14 BRPM | HEART RATE: 99 BPM | TEMPERATURE: 98.1 F | DIASTOLIC BLOOD PRESSURE: 74 MMHG | HEIGHT: 64 IN | SYSTOLIC BLOOD PRESSURE: 122 MMHG | OXYGEN SATURATION: 99 % | BODY MASS INDEX: 25.39 KG/M2

## 2022-11-15 DIAGNOSIS — R10.31: ICD-10-CM
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
## 2022-11-15 PROCEDURE — 99215 OFFICE O/P EST HI 40 MIN: CPT | Performed by: FAMILY MEDICINE

## 2022-11-15 PROCEDURE — 73522 X-RAY EXAM HIPS BI 3-4 VIEWS: CPT

## 2022-11-15 RX ORDER — OXYCODONE HYDROCHLORIDE AND ACETAMINOPHEN 5; 325 MG/1; MG/1
1 TABLET ORAL EVERY 4 HOURS PRN
Qty: 30 TABLET | Refills: 0 | Status: SHIPPED | OUTPATIENT
Start: 2022-11-15 | End: 2022-12-15

## 2022-11-16 NOTE — PROGRESS NOTES
"Chief Complaint   Patient presents with    Fall     Missed a step up the curb and felt something pull. Right groin       Subjective:     HPI:   Gabriel Gonzalez is a 67 y.o. female here  to discuss the evaluation and management of: Recent fall    Approximately 10 days ago she was going up a step falling forward and breaking the fall with her rt arm  She reports when she got up she immediately noticed trouble walking with pain in the right groin area  She reports her body went into shock when this happened sweating profusely and feeling shaky    She took ibuprofen and rested a lot, felt like she was getting better but then she traveled on an airplane for several hours    From that moment on the pain has been higher at 8-10/10    She can feel it all the time but it is worse when she walks pain is described as sharp    She does not have history of osteoporosis  No problems updated.          Objective:     /74   Pulse 99   Temp 36.7 °C (98.1 °F)   Resp 14   Ht 1.626 m (5' 4.02\")   SpO2 99%  Body mass index is 25.39 kg/m².    Physical Exam:  Physical Exam  Constitutional: Well-developed and well-nourished. Not diaphoretic. No distress.   Skin: Skin is warm and dry. No rash noted.  Head: Atraumatic without lesions.  Eyes: Conjunctivae and extraocular motions are normal.   Ears:  External ears unremarkable.    Nose: Nares patent. Mucosa without edema or erythema. No discharge. No facial tenderness.     Neck: Supple, No thyromegaly present. No JVD  Cardiovascular: Regular rate and rhythm.   Chest: Effort normal. Clear to auscultation throughout. No adventitious sounds.   Abdomen:  without distention.  .  Extremities: No cyanosis, clubbing, erythema, nor edema.   She has limited internal and external rotation of the right hip, left hip is normal  Neurological: Alert and oriented x 3.    Psychiatric:  Behavior, mood, and affect are appropriate    DX-HIP-BILATERAL-WITH PELVIS-3/4 VIEWS  Narrative: 11/15/2022 " 11:24 AM    HISTORY/REASON FOR EXAM:  Pelvic/Hip Pain Following Trauma.  Right hip and groin pain    TECHNIQUE/EXAM DESCRIPTION AND NUMBER OF VIEWS:  3 views of the right and left hip.    COMPARISON: None    FINDINGS:  BONY PELVIS: normal in appearance.    HIPS: Normal in appearance.    There are no fracture or malalignment.    Sacroiliac joints:There is bilateral SI joint osteoarthritis..    LUMBAR spine: Facet arthropathy of the lumbar spine.    ABDOMEN: Normal in appearance.  Impression: 1.  Negative for pelvic or proximal femoral fracture    2.  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    1. Severe rt groin pain  - DX-HIP-BILATERAL-WITH PELVIS-3/4 VIEWS; negative for fracture    Consider physical therapy  Possible muscle versus cartilage tear/sprain  Suspect multiple weeks needed for recovery  - oxyCODONE-acetaminophen (PERCOCET) 5-325 MG Tab; Take 1 Tablet by mouth every four hours as needed for Severe Pain for up to 30 days. Indications: Pain  Dispense: 30 Tablet; Refill: 0    2. Fall (on) (from) other stairs and steps, initial encounter                                                                                                                                                                                          Any change or worsening of signs or symptoms, patient encouraged to follow-up or report to emergency room for further evaluation. Patient verbalizes understanding and agrees.    Follow-Up: prn      PLEASE NOTE: This dictation was created using voice recognition software. I have made every reasonable attempt to correct obvious errors, but I expect that there are errors of grammar and possibly content that I did not discover before finalizing the note.      My total time spent caring for the patient on the  day of the encounter was  greater than 40 minutes.   This includes obtaining history, reviewing chart, physical exam, patient education, reviewing outside records, placing orders, interpreting tests and coordinating care.

## 2023-01-12 ENCOUNTER — NON-PROVIDER VISIT (OUTPATIENT)
Dept: INTERNAL MEDICINE | Facility: IMAGING CENTER | Age: 68
End: 2023-01-12
Payer: COMMERCIAL

## 2023-01-12 ENCOUNTER — HOSPITAL ENCOUNTER (OUTPATIENT)
Facility: MEDICAL CENTER | Age: 68
End: 2023-01-12
Attending: FAMILY MEDICINE
Payer: MEDICARE

## 2023-01-12 DIAGNOSIS — R31.0 GROSS HEMATURIA: ICD-10-CM

## 2023-01-12 LAB
APPEARANCE UR: NORMAL
BILIRUB UR STRIP-MCNC: NEGATIVE MG/DL
COLOR UR AUTO: NORMAL
GLUCOSE UR STRIP.AUTO-MCNC: NEGATIVE MG/DL
KETONES UR STRIP.AUTO-MCNC: NEGATIVE MG/DL
LEUKOCYTE ESTERASE UR QL STRIP.AUTO: NORMAL
NITRITE UR QL STRIP.AUTO: NEGATIVE
PH UR STRIP.AUTO: 7 [PH] (ref 5–8)
PROT UR QL STRIP: 30 MG/DL
RBC UR QL AUTO: NORMAL
SP GR UR STRIP.AUTO: 1.02
UROBILINOGEN UR STRIP-MCNC: 0.2 MG/DL

## 2023-01-12 PROCEDURE — 81002 URINALYSIS NONAUTO W/O SCOPE: CPT | Performed by: FAMILY MEDICINE

## 2023-01-12 PROCEDURE — 87086 URINE CULTURE/COLONY COUNT: CPT

## 2023-01-12 NOTE — PROGRESS NOTES
Gabriel is having blood in her urine and pain with urination. She opted to wait for the urine culture results. She will start pyridium for the pain and drink lots of fluids.  She will call the office if anything changes or worsens before the result.

## 2023-01-15 LAB
BACTERIA UR CULT: NORMAL
SIGNIFICANT IND 70042: NORMAL
SITE SITE: NORMAL
SOURCE SOURCE: NORMAL

## 2023-12-27 ENCOUNTER — TELEPHONE (OUTPATIENT)
Dept: INTERNAL MEDICINE | Facility: IMAGING CENTER | Age: 68
End: 2023-12-27
Payer: MEDICARE

## 2023-12-27 DIAGNOSIS — E55.9 VITAMIN D DEFICIENCY: ICD-10-CM

## 2023-12-27 DIAGNOSIS — Z79.899 MEDICATION MANAGEMENT: Primary | ICD-10-CM

## 2023-12-27 DIAGNOSIS — E78.2 MIXED HYPERLIPIDEMIA: ICD-10-CM

## 2023-12-27 NOTE — TELEPHONE ENCOUNTER
----- Message from Connie Hoang R.N. sent at 12/26/2023 10:46 AM PST -----  Please add annual labs.    Thanks!

## 2023-12-28 ENCOUNTER — NON-PROVIDER VISIT (OUTPATIENT)
Dept: INTERNAL MEDICINE | Facility: IMAGING CENTER | Age: 68
End: 2023-12-28
Payer: MEDICARE

## 2023-12-28 ENCOUNTER — HOSPITAL ENCOUNTER (OUTPATIENT)
Facility: MEDICAL CENTER | Age: 68
End: 2023-12-28
Attending: FAMILY MEDICINE
Payer: MEDICARE

## 2023-12-28 DIAGNOSIS — E78.2 MIXED HYPERLIPIDEMIA: ICD-10-CM

## 2023-12-28 DIAGNOSIS — Z79.899 MEDICATION MANAGEMENT: ICD-10-CM

## 2023-12-28 DIAGNOSIS — E55.9 VITAMIN D DEFICIENCY: ICD-10-CM

## 2023-12-28 LAB
25(OH)D3 SERPL-MCNC: 26 NG/ML (ref 30–100)
ALBUMIN SERPL BCP-MCNC: 4.5 G/DL (ref 3.2–4.9)
ALBUMIN/GLOB SERPL: 1.7 G/DL
ALP SERPL-CCNC: 88 U/L (ref 30–99)
ALT SERPL-CCNC: 11 U/L (ref 2–50)
ANION GAP SERPL CALC-SCNC: 11 MMOL/L (ref 7–16)
AST SERPL-CCNC: 13 U/L (ref 12–45)
BASOPHILS # BLD AUTO: 1.3 % (ref 0–1.8)
BASOPHILS # BLD: 0.12 K/UL (ref 0–0.12)
BILIRUB SERPL-MCNC: 0.3 MG/DL (ref 0.1–1.5)
BUN SERPL-MCNC: 11 MG/DL (ref 8–22)
CALCIUM ALBUM COR SERPL-MCNC: 9.1 MG/DL (ref 8.5–10.5)
CALCIUM SERPL-MCNC: 9.5 MG/DL (ref 8.5–10.5)
CHLORIDE SERPL-SCNC: 107 MMOL/L (ref 96–112)
CHOLEST SERPL-MCNC: 155 MG/DL (ref 100–199)
CO2 SERPL-SCNC: 26 MMOL/L (ref 20–33)
CREAT SERPL-MCNC: 0.78 MG/DL (ref 0.5–1.4)
EOSINOPHIL # BLD AUTO: 0.22 K/UL (ref 0–0.51)
EOSINOPHIL NFR BLD: 2.3 % (ref 0–6.9)
ERYTHROCYTE [DISTWIDTH] IN BLOOD BY AUTOMATED COUNT: 45.1 FL (ref 35.9–50)
EST. AVERAGE GLUCOSE BLD GHB EST-MCNC: 117 MG/DL
GFR SERPLBLD CREATININE-BSD FMLA CKD-EPI: 83 ML/MIN/1.73 M 2
GLOBULIN SER CALC-MCNC: 2.7 G/DL (ref 1.9–3.5)
GLUCOSE SERPL-MCNC: 96 MG/DL (ref 65–99)
HBA1C MFR BLD: 5.7 % (ref 4–5.6)
HCT VFR BLD AUTO: 48.5 % (ref 37–47)
HDLC SERPL-MCNC: 44 MG/DL
HGB BLD-MCNC: 16.6 G/DL (ref 12–16)
IMM GRANULOCYTES # BLD AUTO: 0.02 K/UL (ref 0–0.11)
IMM GRANULOCYTES NFR BLD AUTO: 0.2 % (ref 0–0.9)
LDLC SERPL CALC-MCNC: 83 MG/DL
LYMPHOCYTES # BLD AUTO: 3.47 K/UL (ref 1–4.8)
LYMPHOCYTES NFR BLD: 36.7 % (ref 22–41)
MCH RBC QN AUTO: 32.1 PG (ref 27–33)
MCHC RBC AUTO-ENTMCNC: 34.2 G/DL (ref 32.2–35.5)
MCV RBC AUTO: 93.8 FL (ref 81.4–97.8)
MONOCYTES # BLD AUTO: 0.49 K/UL (ref 0–0.85)
MONOCYTES NFR BLD AUTO: 5.2 % (ref 0–13.4)
NEUTROPHILS # BLD AUTO: 5.13 K/UL (ref 1.82–7.42)
NEUTROPHILS NFR BLD: 54.3 % (ref 44–72)
NRBC # BLD AUTO: 0 K/UL
NRBC BLD-RTO: 0 /100 WBC (ref 0–0.2)
PLATELET # BLD AUTO: 241 K/UL (ref 164–446)
PMV BLD AUTO: 11.9 FL (ref 9–12.9)
POTASSIUM SERPL-SCNC: 4 MMOL/L (ref 3.6–5.5)
PROT SERPL-MCNC: 7.2 G/DL (ref 6–8.2)
RBC # BLD AUTO: 5.17 M/UL (ref 4.2–5.4)
SODIUM SERPL-SCNC: 144 MMOL/L (ref 135–145)
TRIGL SERPL-MCNC: 142 MG/DL (ref 0–149)
TSH SERPL DL<=0.005 MIU/L-ACNC: 4.2 UIU/ML (ref 0.38–5.33)
VIT B12 SERPL-MCNC: 270 PG/ML (ref 211–911)
WBC # BLD AUTO: 9.5 K/UL (ref 4.8–10.8)

## 2023-12-28 PROCEDURE — 82306 VITAMIN D 25 HYDROXY: CPT

## 2023-12-28 PROCEDURE — 83036 HEMOGLOBIN GLYCOSYLATED A1C: CPT

## 2023-12-28 PROCEDURE — 80053 COMPREHEN METABOLIC PANEL: CPT

## 2023-12-28 PROCEDURE — 84443 ASSAY THYROID STIM HORMONE: CPT

## 2023-12-28 PROCEDURE — 85025 COMPLETE CBC W/AUTO DIFF WBC: CPT

## 2023-12-28 PROCEDURE — 99999 PR NO CHARGE: CPT

## 2023-12-28 PROCEDURE — 82607 VITAMIN B-12: CPT

## 2023-12-28 PROCEDURE — 80061 LIPID PANEL: CPT

## 2024-01-04 RX ORDER — NITROFURANTOIN 25; 75 MG/1; MG/1
1 CAPSULE ORAL 2 TIMES DAILY
COMMUNITY
End: 2024-01-08

## 2024-01-04 RX ORDER — CEFDINIR 300 MG/1
1 CAPSULE ORAL 2 TIMES DAILY
COMMUNITY
End: 2024-01-08 | Stop reason: CLARIF

## 2024-01-08 ENCOUNTER — OFFICE VISIT (OUTPATIENT)
Dept: INTERNAL MEDICINE | Facility: IMAGING CENTER | Age: 69
End: 2024-01-08
Payer: MEDICARE

## 2024-01-08 VITALS
DIASTOLIC BLOOD PRESSURE: 72 MMHG | TEMPERATURE: 97.2 F | RESPIRATION RATE: 14 BRPM | WEIGHT: 140 LBS | BODY MASS INDEX: 24.8 KG/M2 | OXYGEN SATURATION: 96 % | HEIGHT: 63 IN | SYSTOLIC BLOOD PRESSURE: 124 MMHG | HEART RATE: 83 BPM

## 2024-01-08 DIAGNOSIS — Z78.0 ASYMPTOMATIC POSTMENOPAUSAL STATUS: ICD-10-CM

## 2024-01-08 DIAGNOSIS — Z12.31 ENCOUNTER FOR SCREENING MAMMOGRAM FOR BREAST CANCER: ICD-10-CM

## 2024-01-08 DIAGNOSIS — Z00.00 MEDICARE ANNUAL WELLNESS VISIT, SUBSEQUENT: Primary | ICD-10-CM

## 2024-01-08 DIAGNOSIS — R92.30 DENSE BREAST TISSUE: ICD-10-CM

## 2024-01-08 DIAGNOSIS — E55.9 VITAMIN D DEFICIENCY: ICD-10-CM

## 2024-01-08 DIAGNOSIS — Z23 NEED FOR VACCINATION: ICD-10-CM

## 2024-01-08 PROCEDURE — G0439 PPPS, SUBSEQ VISIT: HCPCS | Mod: 25 | Performed by: FAMILY MEDICINE

## 2024-01-08 PROCEDURE — 3078F DIAST BP <80 MM HG: CPT | Performed by: FAMILY MEDICINE

## 2024-01-08 PROCEDURE — 90677 PCV20 VACCINE IM: CPT | Performed by: FAMILY MEDICINE

## 2024-01-08 PROCEDURE — 3074F SYST BP LT 130 MM HG: CPT | Performed by: FAMILY MEDICINE

## 2024-01-08 PROCEDURE — G0008 ADMIN INFLUENZA VIRUS VAC: HCPCS | Performed by: FAMILY MEDICINE

## 2024-01-08 PROCEDURE — 90662 IIV NO PRSV INCREASED AG IM: CPT | Performed by: FAMILY MEDICINE

## 2024-01-08 PROCEDURE — G0009 ADMIN PNEUMOCOCCAL VACCINE: HCPCS | Performed by: FAMILY MEDICINE

## 2024-01-08 ASSESSMENT — FIBROSIS 4 INDEX: FIB4 SCORE: 1.09

## 2024-01-08 ASSESSMENT — PATIENT HEALTH QUESTIONNAIRE - PHQ9: CLINICAL INTERPRETATION OF PHQ2 SCORE: 0

## 2024-01-08 ASSESSMENT — ACTIVITIES OF DAILY LIVING (ADL): BATHING_REQUIRES_ASSISTANCE: 0

## 2024-01-08 ASSESSMENT — ENCOUNTER SYMPTOMS: GENERAL WELL-BEING: GOOD

## 2024-01-08 NOTE — PROGRESS NOTES
HPI:  Gabriel Gonzalez is a 68 y.o. here for Medicare Annual Wellness Visit     Doing well, she has a right ankle injury that has been acting.  That has been keeping her from doing her routine exercise.    Her  recently lost a lot of weight because they have been eating healthier.        Patient Active Problem List    Diagnosis Date Noted    Mixed hyperlipidemia 05/17/2021    Abnormal findings on diagnostic imaging of breast 05/12/2021    Gross hematuria 04/02/2021    Tobacco dependence 11/11/2019    Vitamin D deficiency 05/29/2018    History of fracture 04/10/2014    Osteopenia 08/16/2011    Anxiety     Chronic neck pain     Chronic back pain        Current Outpatient Medications   Medication Sig Dispense Refill    vitamin D (CHOLECALCIFEROL) 1000 Unit (25 mcg) Tab Take 2,000 Units by mouth every day.       No current facility-administered medications for this visit.          Current supplements as per medication list.     Allergies: Nkda [no known drug allergy]    Current social contact/activities: friends/family     She  reports that she quit smoking about 23 years ago. Her smoking use included cigarettes. She started smoking about 38 years ago. She has a 7.5 pack-year smoking history. She has never used smokeless tobacco. She reports that she does not drink alcohol and does not use drugs.  Counseling given: Not Answered      ROS:          Gait: Uses no assistive device  Ostomy: No  Other tubes: No  Amputations: No  Chronic oxygen use: No  Last eye exam: Up-to-date  Wears hearing aids: No   : Denies any unmanageable urinary leakage during the last 6 months       Screening:     Depression Screening  Little interest or pleasure in doing things?  0 - not at all  Feeling down, depressed , or hopeless? 0 - not at all  Patient Health Questionnaire Score: 0     If depressive symptoms identified deferred to follow up visit unless specifically addressed in assessment and plan.    Interpretation of PHQ-9  Total Score   Score Severity   1-4 No Depression   5-9 Mild Depression   10-14 Moderate Depression   15-19 Moderately Severe Depression   20-27 Severe Depression    Screening for Cognitive Impairment  Do you or any of your friends or family members have any concern about your memory? No  Three Minute Recall (Banana, Sunrise, Chair) 3/3    Sunil clock face with all 12 numbers and set the hands to show 20 past 8.  Yes    Cognitive concerns identified deferred for follow up unless specifically addressed in assessment and plan.    Fall Risk Assessment  Has the patient had two or more falls in the last year or any fall with injury in the last year?  No    Safety Assessment  Do you always wear your seatbelt?  Yes  Any changes to home needed to function safely? No  Difficulty hearing.  No  Patient counseled about all safety risks that were identified.    Functional Assessment ADLs  Are there any barriers preventing you from cooking for yourself or meeting nutritional needs?  No.    Are there any barriers preventing you from driving safely or obtaining transportation?  No.    Are there any barriers preventing you from using a telephone or calling for help?  No    Are there any barriers preventing you from shopping?  No.    Are there any barriers preventing you from taking care of your own finances?  No    Are there any barriers preventing you from managing your medications?  No    Are there any barriers preventing you from showering, bathing or dressing yourself? No    Are there any barriers preventing you from doing housework or laundry? No  Are there any barriers preventing you from using the toilet?No  Are you currently engaging in any exercise or physical activity?  No.      Self-Assessment of Health  What is your perception of your health? Good  Do you sleep more than six hours a night? Yes  In the past 7 days, how much did pain keep you from doing your normal work? None  Do you spend quality time with family or friends  (virtually or in person)? Yes  Do you usually eat a heart healthy diet that constists of a variety of fruits, vegetables, whole grains and fiber? Yes  Do you eat foods high in fat and/or Fast Food more than three times per week? No    Advance Care Planning  Do you have an Advance Directive, Living Will, Durable Power of , or POLST? No                 Health Maintenance Summary            Overdue - Hepatitis A Vaccine (Hep A) (1 of 2 - Risk 2-dose series) Overdue - never done      No completion history exists for this topic.              Overdue - COVID-19 Vaccine (4 - 2023-24 season) Overdue since 9/1/2023      10/02/2022  Imm Admin: MODERNA BIVALENT BOOSTER SARS-COV-2 VACCINE (6+)    11/27/2021  Imm Admin: MODERNA SARS-COV-2 VACCINE (12+)    04/26/2021  Imm Admin: MODERNA SARS-COV-2 VACCINE (12+)    03/25/2021  Imm Admin: MODERNA SARS-COV-2 VACCINE (12+)              Overdue - Annual Wellness Visit (Every 366 Days) Overdue since 10/1/2023      09/30/2022  Level of Service: [place] ANNUAL WELLNESS VISIT-INCLUDES PPPS SUBSEQUE*    09/30/2022  Visit Dx: Medicare annual wellness visit, subsequent              Mammogram (Every 2 Years) Next due on 7/26/2024 07/26/2022  MA-DIAGNOSTIC MAMMO BILAT W/TOMOSYNTHESIS W/CAD    01/06/2022  MA-DIAGNOSTIC MAMMO RIGHT W/TOMOSYNTHESIS W/CAD    05/11/2021  MA-DIAGNOSTIC MAMMO RIGHT W/TOMOSYNTHESIS W/O CAD    05/05/2021  MA-SCREENING MAMMO BILAT W/TOMOSYNTHESIS W/CAD    12/23/2019  MA-SCREENING MAMMO BILAT W/TOMOSYNTHESIS W/CAD    Only the first 5 history entries have been loaded, but more history exists.              Bone Density Scan (Every 5 Years) Tentatively due on 5/5/2026 05/05/2021  DS-BONE DENSITY STUDY (DEXA)    02/05/2018  DS-BONE DENSITY STUDY (DEXA)    03/17/2008  DS-BONE DENSITY STUDY (DEXA)              Colorectal Cancer Screening (Colonoscopy - Every 10 Years) Tentatively due on 10/18/2028      10/18/2018  REFERRAL TO GI FOR COLONOSCOPY              IMM  DTaP/Tdap/Td Vaccine (2 - Td or Tdap) Next due on 2/20/2030 02/20/2020  Imm Admin: Tdap Vaccine    07/23/2008  Imm Admin: TD Vaccine              Hepatitis C Screening  Tentatively Complete      02/01/2018  Hepatitis C Antibody component of HEP C VIRUS ANTIBODY              Zoster (Shingles) Vaccines (Series Information) Completed      12/08/2020  Imm Admin: Zoster Vaccine Recombinant (RZV) (SHINGRIX)    02/20/2020  Imm Admin: Zoster Vaccine Recombinant (RZV) (SHINGRIX)              Influenza Vaccine (Series Information) Completed      01/08/2024  Imm Admin: Influenza Vaccine Adult HD    10/14/2021  Imm Admin: Influenza Vaccine Adult HD    11/11/2019  Imm Admin: Influenza Vaccine Quad Inj (Pf)              Pneumococcal Vaccine: 65+ Years (Series Information) Completed      01/08/2024  Imm Admin: Pneumococcal Conjugate Vaccine (PCV20)              Hepatitis B Vaccine (Hep B) (Series Information) Aged Out      No completion history exists for this topic.              HPV Vaccines (Series Information) Aged Out      No completion history exists for this topic.              Polio Vaccine (Inactivated Polio) (Series Information) Aged Out      No completion history exists for this topic.              Meningococcal Immunization (Series Information) Aged Out      No completion history exists for this topic.              Discontinued - Cervical Cancer Screening  Discontinued        Frequency changed to Never automatically (Topic No Longer Applies)    02/20/2020  Pathology Gynecology Specimen    02/20/2020  THINPREP PAP, REFLEX HPV ON ASC-US AND ABOVE    01/04/2018  PATHOLOGY GYN SPECIMEN    01/04/2018  THINPREP PAP, REFLEX HPV ON ASC-US AND ABOVE    Only the first 5 history entries have been loaded, but more history exists.                    Patient Care Team:  Tequila Saini M.D. as PCP - General (Family Medicine)  Connie Hoang R.N. as Registered Nurse        Social History     Tobacco Use    Smoking status:  "Former     Current packs/day: 0.00     Average packs/day: 0.5 packs/day for 15.0 years (7.5 ttl pk-yrs)     Types: Cigarettes     Start date: 10/1/1985     Quit date: 10/1/2000     Years since quittin.2    Smokeless tobacco: Never   Vaping Use    Vaping Use: Never used   Substance Use Topics    Alcohol use: No    Drug use: No     Family History   Problem Relation Age of Onset    Hypertension Mother     Heart Disease Mother         atrial fib    Diabetes Father     Cancer Father         pancreatic    Heart Disease Sister         valvular    Heart Disease Brother         a fib    Heart Disease Sister         sinus arrhythmia    Diabetes Sister     Hypertension Maternal Grandmother     Stroke Maternal Grandmother      She  has a past medical history of Anxiety, Arthritis, Chronic back pain, Chronic neck pain, Heart burn, History of fracture (4/10/2014), and OSTEOPOROSIS.   Past Surgical History:   Procedure Laterality Date    US-NEEDLE CORE BX-BREAST PANEL Right 2011    benign    BREAST BIOPSY  2009    Performed by JENNIFER GALAN at [facility]    GANGLION EXCISION  08    Performed by INGRID OVIEDO at [facility]ERY [facility]    CARPAL TUNNEL ENDOSCOPIC  08    Performed by INGRID OVIEDO at [facility]ERY [facility]    COLONOSCOPY  2008    recheck 7 years    US-NEEDLE CORE BX-BREAST PANEL Left 1973    benign    LUMPECTOMY  1972    LEFT BREAST    CHOLECYSTECTOMY         Exam:   /72   Pulse 83   Temp 36.2 °C (97.2 °F)   Resp 14   Ht 1.607 m (5' 3.25\")   Wt 63.5 kg (140 lb)   SpO2 96%  Body mass index is 24.6 kg/m².         Physical Exam  Constitutional:       General: She is not in acute distress.     Appearance: Normal appearance.   HENT:      Head: Normocephalic and atraumatic.      Nose: Nose normal.      Mouth/Throat:      Mouth: Mucous membranes are moist.   Eyes:      Conjunctiva/sclera: Conjunctivae normal.   Cardiovascular:      Rate and Rhythm: " Normal rate and regular rhythm.   Pulmonary:      Effort: Pulmonary effort is normal.      Breath sounds: Normal breath sounds. No wheezing.   Abdominal:      General: Abdomen is flat.      Palpations: Abdomen is soft.   Musculoskeletal:      Cervical back: No rigidity.      Right lower leg: No edema.      Left lower leg: No edema.   Lymphadenopathy:      Cervical: No cervical adenopathy.   Skin:     Coloration: Skin is not jaundiced.      Findings: No erythema.   Neurological:      General: No focal deficit present.      Mental Status: She is alert and oriented to person, place, and time. Mental status is at baseline.   Psychiatric:         Mood and Affect: Mood normal.         Behavior: Behavior normal.         Thought Content: Thought content normal.         Judgment: Judgment normal.       Hospital Outpatient Visit on 12/28/2023   Component Date Value Ref Range Status    WBC 12/28/2023 9.5  4.8 - 10.8 K/uL Final    RBC 12/28/2023 5.17  4.20 - 5.40 M/uL Final    Hemoglobin 12/28/2023 16.6 (H)  12.0 - 16.0 g/dL Final    Hematocrit 12/28/2023 48.5 (H)  37.0 - 47.0 % Final    MCV 12/28/2023 93.8  81.4 - 97.8 fL Final    MCH 12/28/2023 32.1  27.0 - 33.0 pg Final    MCHC 12/28/2023 34.2  32.2 - 35.5 g/dL Final    Please note new reference range effective 05/22/2023.    RDW 12/28/2023 45.1  35.9 - 50.0 fL Final    Platelet Count 12/28/2023 241  164 - 446 K/uL Final    MPV 12/28/2023 11.9  9.0 - 12.9 fL Final    Neutrophils-Polys 12/28/2023 54.30  44.00 - 72.00 % Final    Lymphocytes 12/28/2023 36.70  22.00 - 41.00 % Final    Monocytes 12/28/2023 5.20  0.00 - 13.40 % Final    Eosinophils 12/28/2023 2.30  0.00 - 6.90 % Final    Basophils 12/28/2023 1.30  0.00 - 1.80 % Final    Immature Granulocytes 12/28/2023 0.20  0.00 - 0.90 % Final    Nucleated RBC 12/28/2023 0.00  0.00 - 0.20 /100 WBC Final    Please note new reference range effective 05/22/2023.    Neutrophils (Absolute) 12/28/2023 5.13  1.82 - 7.42 K/uL Final     Comment: Includes immature neutrophils, if present.  Please note new reference range effective 05/22/2023.      Lymphs (Absolute) 12/28/2023 3.47  1.00 - 4.80 K/uL Final    Monos (Absolute) 12/28/2023 0.49  0.00 - 0.85 K/uL Final    Eos (Absolute) 12/28/2023 0.22  0.00 - 0.51 K/uL Final    Baso (Absolute) 12/28/2023 0.12  0.00 - 0.12 K/uL Final    Immature Granulocytes (abs) 12/28/2023 0.02  0.00 - 0.11 K/uL Final    NRBC (Absolute) 12/28/2023 0.00  K/uL Final    TSH 12/28/2023 4.200  0.380 - 5.330 uIU/mL Final    Comment: The 2011 American Thyroid Association (CHIP) guidelines  recommended that the interpretation of thyroid function in  pregnancy be based on trimester specific reference ranges.    1st Trimester  0.100-2.500 mIU/L  2nd Trimester  0.200-3.000 mIU/L  3rd Trimester  0.300-3.500 mIU/L    These established reference ranges have not been validated  at [employer].      Cholesterol,Tot 12/28/2023 155  100 - 199 mg/dL Final    Triglycerides 12/28/2023 142  0 - 149 mg/dL Final    HDL 12/28/2023 44  >=40 mg/dL Final    LDL 12/28/2023 83  <100 mg/dL Final    Sodium 12/28/2023 144  135 - 145 mmol/L Final    Potassium 12/28/2023 4.0  3.6 - 5.5 mmol/L Final    Chloride 12/28/2023 107  96 - 112 mmol/L Final    Co2 12/28/2023 26  20 - 33 mmol/L Final    Anion Gap 12/28/2023 11.0  7.0 - 16.0 Final    Glucose 12/28/2023 96  65 - 99 mg/dL Final    Bun 12/28/2023 11  8 - 22 mg/dL Final    Creatinine 12/28/2023 0.78  0.50 - 1.40 mg/dL Final    Calcium 12/28/2023 9.5  8.5 - 10.5 mg/dL Final    Correct Calcium 12/28/2023 9.1  8.5 - 10.5 mg/dL Final    AST(SGOT) 12/28/2023 13  12 - 45 U/L Final    ALT(SGPT) 12/28/2023 11  2 - 50 U/L Final    Alkaline Phosphatase 12/28/2023 88  30 - 99 U/L Final    Total Bilirubin 12/28/2023 0.3  0.1 - 1.5 mg/dL Final    Albumin 12/28/2023 4.5  3.2 - 4.9 g/dL Final    Total Protein 12/28/2023 7.2  6.0 - 8.2 g/dL Final    Globulin 12/28/2023 2.7  1.9 - 3.5 g/dL Final    A-G  Ratio 12/28/2023 1.7  g/dL Final    Vitamin B12 -True Cobalamin 12/28/2023 270  211 - 911 pg/mL Final    Glycohemoglobin 12/28/2023 5.7 (H)  4.0 - 5.6 % Final    Comment: Increased risk for diabetes:  5.7 -6.4%  Diabetes:  >6.4%  Glycemic control for adults with diabetes:  <7.0%    The above interpretations are per ADA guidelines.  Diagnosis  of diabetes mellitus on the basis of elevated Hemoglobin A1c  should be confirmed by repeating the Hb A1c test.      Est Avg Glucose 12/28/2023 117  mg/dL Final    Comment: The eAG calculation is based on the A1c-Derived Daily Glucose  (ADAG) study.  See the ADA's website for additional information.      25-Hydroxy   Vitamin D 25 12/28/2023 26 (L)  30 - 100 ng/mL Final    Comment: Adult Ranges:   <20 ng/mL - Deficiency  20-29 ng/mL - Insufficiency   ng/mL - Sufficiency  Electrochemiluminescence binding assay performed using Roche nicko e  immunoassay analyzer.  The Elecsys Vitamin D total II assay is intended for  the quantitative determination of total 25 hydroxyvitamin D in human serum  and plasma. This assay is to be used as an aid in the assessment of vitamin  D sufficiency in adults.      GFR (CKD-EPI) 12/28/2023 83  >60 mL/min/1.73 m 2 Final    Comment: Estimated Glomerular Filtration Rate is calculated using  race neutral CKD-EPI 2021 equation per NKF-ASN recommendations.     ]       Assessment and Plan. The following treatment and monitoring plan is recommended:     1. Medicare annual wellness visit, subsequent  Reviewed all labs, cholesterol is improved  Blood pressure is also improved  Patient will be doing more routine exercise  Test recommendations for shoes and possible referral to podiatry or foot and ankle orthopedics    2. Need for vaccination  - Pneumococcal Conjugate Vaccine 20-Valent (6 wks+)  - INFLUENZA VACCINE, HIGH DOSE (65+ ONLY)    3. Vitamin D deficiency-increasing her vitamin D intake             Services suggested: No services needed at this  time  Health Care Screening: Age-appropriate preventive services recommended by USPTF and ACIP covered by Medicare were discussed today. Services ordered if indicated and agreed upon by the patient.  Referrals offered: Community-based lifestyle interventions to reduce health risks and promote self-management and wellness, fall prevention, nutrition, physical activity, tobacco-use cessation, weight loss, and mental health services as per orders if indicated.    Discussion today about general wellness and lifestyle habits:    Prevent falls and reduce trip hazards; Cautioned about securing or removing rugs.  Have a working fire alarm and carbon monoxide detector;   Engage in regular physical activity and social activities     Follow-up:  Labs every 6 months and routine annual wellness exam

## 2024-01-09 DIAGNOSIS — R92.8 ABNORMAL MAMMOGRAM: ICD-10-CM

## 2024-03-22 ENCOUNTER — APPOINTMENT (OUTPATIENT)
Dept: RADIOLOGY | Facility: MEDICAL CENTER | Age: 69
End: 2024-03-22
Attending: FAMILY MEDICINE
Payer: MEDICARE

## 2024-03-25 ENCOUNTER — HOSPITAL ENCOUNTER (OUTPATIENT)
Dept: RADIOLOGY | Facility: MEDICAL CENTER | Age: 69
End: 2024-03-25
Attending: FAMILY MEDICINE
Payer: COMMERCIAL

## 2024-03-25 DIAGNOSIS — R92.8 ABNORMAL MAMMOGRAM: ICD-10-CM

## 2024-03-25 PROCEDURE — 76642 ULTRASOUND BREAST LIMITED: CPT | Mod: RT

## 2024-03-25 PROCEDURE — G0279 TOMOSYNTHESIS, MAMMO: HCPCS

## 2024-04-01 ENCOUNTER — APPOINTMENT (OUTPATIENT)
Dept: RADIOLOGY | Facility: MEDICAL CENTER | Age: 69
End: 2024-04-01
Attending: FAMILY MEDICINE
Payer: MEDICARE

## 2024-04-09 ENCOUNTER — HOSPITAL ENCOUNTER (OUTPATIENT)
Dept: RADIOLOGY | Facility: MEDICAL CENTER | Age: 69
End: 2024-04-09
Attending: FAMILY MEDICINE
Payer: MEDICARE

## 2024-04-09 ENCOUNTER — OFFICE VISIT (OUTPATIENT)
Dept: INTERNAL MEDICINE | Facility: IMAGING CENTER | Age: 69
End: 2024-04-09
Payer: MEDICARE

## 2024-04-09 VITALS
SYSTOLIC BLOOD PRESSURE: 124 MMHG | BODY MASS INDEX: 24.59 KG/M2 | HEART RATE: 87 BPM | TEMPERATURE: 97.1 F | HEIGHT: 63 IN | OXYGEN SATURATION: 97 % | DIASTOLIC BLOOD PRESSURE: 70 MMHG | RESPIRATION RATE: 14 BRPM

## 2024-04-09 DIAGNOSIS — J20.9 ACUTE BRONCHITIS, UNSPECIFIED ORGANISM: Primary | ICD-10-CM

## 2024-04-09 DIAGNOSIS — R05.2 SUBACUTE COUGH: ICD-10-CM

## 2024-04-09 DIAGNOSIS — R07.89 ACUTE CHEST WALL PAIN: ICD-10-CM

## 2024-04-09 PROCEDURE — 99214 OFFICE O/P EST MOD 30 MIN: CPT | Performed by: FAMILY MEDICINE

## 2024-04-09 PROCEDURE — 3074F SYST BP LT 130 MM HG: CPT | Performed by: FAMILY MEDICINE

## 2024-04-09 PROCEDURE — 3078F DIAST BP <80 MM HG: CPT | Performed by: FAMILY MEDICINE

## 2024-04-09 RX ORDER — DOXYCYCLINE HYCLATE 100 MG
100 TABLET ORAL 2 TIMES DAILY
Qty: 20 TABLET | Refills: 0 | Status: SHIPPED | OUTPATIENT
Start: 2024-04-09

## 2024-04-09 NOTE — PATIENT INSTRUCTIONS
Cough, Adult  Coughing is a reflex that clears your throat and your airways (respiratory system). Coughing helps to heal and protect your lungs. It is normal to cough occasionally, but a cough that happens with other symptoms or lasts a long time may be a sign of a condition that needs treatment. An acute cough may only last 2-3 weeks, while a chronic cough may last 8 or more weeks.  Coughing is commonly caused by:  Infection of the respiratory systemby viruses or bacteria.  Breathing in substances that irritate your lungs.  Allergies.  Asthma.  Mucus that runs down the back of your throat (postnasal drip).  Smoking.  Acid backing up from the stomach into the esophagus (gastroesophageal reflux).  Certain medicines.  Chronic lung problems.  Other medical conditions such as heart failure or a blood clot in the lung (pulmonary embolism).  Follow these instructions at home:  Medicines  Take over-the-counter and prescription medicines only as told by your health care provider.  Talk with your health care provider before you take a cough suppressant medicine.  Lifestyle    Avoid cigarette smoke. Do not use any products that contain nicotine or tobacco, such as cigarettes, e-cigarettes, and chewing tobacco. If you need help quitting, ask your health care provider.  Drink enough fluid to keep your urine pale yellow.  Avoid caffeine.  Do not drink alcohol if your health care provider tells you not to drink.  General instructions    Pay close attention to changes in your cough. Tell your health care provider about them.  Always cover your mouth when you cough.  Avoid things that make you cough, such as perfume, candles, cleaning products, or campfire or tobacco smoke.  If the air is dry, use a cool mist vaporizer or humidifier in your bedroom or your home to help loosen secretions.  If your cough is worse at night, try to sleep in a semi-upright position.  Rest as needed.  Keep all follow-up visits as told by your health care  provider. This is important.  Contact a health care provider if you:  Have new symptoms.  Cough up pus.  Have a cough that does not get better after 2-3 weeks or gets worse.  Cannot control your cough with cough suppressant medicines and you are losing sleep.  Have pain that gets worse or pain that is not helped with medicine.  Have a fever.  Have unexplained weight loss.  Have night sweats.  Get help right away if:  You cough up blood.  You have difficulty breathing.  Your heartbeat is very fast.  These symptoms may represent a serious problem that is an emergency. Do not wait to see if the symptoms will go away. Get medical help right away. Call your local emergency services (911 in the [place] Do not drive yourself to the hospital.  Summary  Coughing is a reflex that clears your throat and your airways. It is normal to cough occasionally, but a cough that happens with other symptoms or lasts a long time may be a sign of a condition that needs treatment.  Take over-the-counter and prescription medicines only as told by your health care provider.  Always cover your mouth when you cough.  Contact a health care provider if you have new symptoms or a cough that does not get better after 2-3 weeks or gets worse.  This information is not intended to replace advice given to you by your health care provider. Make sure you discuss any questions you have with your health care provider.  Document Revised: 01/06/2020 Document Reviewed: 01/06/2020  Elsevier Patient Education © 2023 [employer]

## 2024-04-09 NOTE — PROGRESS NOTES
"Verbal consent was acquired by the patient to use [employer] ambient listening note generation during this visit     Patient is a 68 y.o.female.established patient      History of Present Illness  The patient presents for evaluation of cough and breast pain.    The patient began experiencing a cough last mo. prior to her mammogram in 03/2024, and has since experienced pain across her breast plate since her mammogram.   This pain is new for her.  She has been expectorating phlegm since her trip to [place] for a six-week period.   The onset of her symptoms coincided with a cold during the fourth weekend of her stay in [place].   Her  conducted a COVID-19 test, the results of which were negative.   Following the resolution of the virus within 6 to 7 days, she reports a sensation in her left lung that has worsened since her mammogram.   She has been expectorating a teaspoonful of phlegm every 1 to 2 hours, which is clear throughout the day, albeit slightly yellow in the mornings. She denies experiencing fever or shortness of breath.   She reports chest congestion, which is too painful to cough, and believes an antibiotic is necessary.   Her  also experienced similar symptoms, which have since resolved.   She has a history of double pneumonia and walking pneumonia. Her energy levels remain normal, and she denies head congestion or sinus pain. Her throat feels fine. She does not cough during the night. She has no history of asthma or family history of asthma. She has previously taken doxycycline. She has never been a heavy smoker, but has almost quit, but smokes cigarettes occasionally.     The patient has no known drug allergies.            /70   Pulse 87   Temp 36.2 °C (97.1 °F)   Resp 14   Ht 1.607 m (5' 3.27\")   SpO2 97% , Body mass index is 24.59 kg/m².    Physical Exam      Physical Exam  Constitutional:       General: She is not in acute distress.     Appearance: Normal appearance. She is " not ill-appearing, toxic-appearing or diaphoretic.   HENT:      Head: Normocephalic and atraumatic.   Eyes:      Conjunctiva/sclera: Conjunctivae normal.   Cardiovascular:      Rate and Rhythm: Normal rate and regular rhythm.   Pulmonary:      Effort: Pulmonary effort is normal. No respiratory distress.      Breath sounds: Normal breath sounds. No wheezing, rhonchi or rales.   Chest:      Chest wall: Tenderness present.   Musculoskeletal:      Cervical back: Neck supple.   Neurological:      Mental Status: She is alert and oriented to person, place, and time. Mental status is at baseline.   Psychiatric:         Mood and Affect: Mood normal.         Behavior: Behavior normal.         Thought Content: Thought content normal.         Judgment: Judgment normal.             Results            Assessment & Plan  1. Cough and breast pain.  A prescription for doxycycline has been issued, with instructions for the patient to take it with food if exp. gastrointestinal upset. Additionally, a 7-day course of Monistat has been recommended  for potential yeast infections. A chest x-ray has been ordered       1. Acute bronchitis, unspecified organism       2. Acute chest wall pain       3. Subacute cough     - DX-CHEST-2 VIEWS; Future           This note was created using voice recognition software (Dragon). The accuracy of the dictation is limited by the abilities of the software. I have reviewed the note prior to signing, however some errors in grammar and context are still possible. If you have any questions related to this note please do not hesitate to contact our office.

## 2024-04-10 ENCOUNTER — HOSPITAL ENCOUNTER (OUTPATIENT)
Dept: RADIOLOGY | Facility: MEDICAL CENTER | Age: 69
End: 2024-04-10
Attending: FAMILY MEDICINE
Payer: MEDICARE

## 2024-04-10 PROCEDURE — 71046 X-RAY EXAM CHEST 2 VIEWS: CPT

## 2024-07-23 ENCOUNTER — PATIENT MESSAGE (OUTPATIENT)
Dept: INTERNAL MEDICINE | Facility: IMAGING CENTER | Age: 69
End: 2024-07-23
Payer: MEDICARE

## 2024-07-23 DIAGNOSIS — Z12.83 SKIN CANCER SCREENING: ICD-10-CM

## 2024-08-13 ENCOUNTER — OFFICE VISIT (OUTPATIENT)
Dept: DERMATOLOGY | Facility: IMAGING CENTER | Age: 69
End: 2024-08-13
Payer: MEDICARE

## 2024-08-13 DIAGNOSIS — D22.9 MULTIPLE NEVI: ICD-10-CM

## 2024-08-13 DIAGNOSIS — L81.4 LENTIGINES: ICD-10-CM

## 2024-08-13 DIAGNOSIS — D18.01 CHERRY ANGIOMA: ICD-10-CM

## 2024-08-13 DIAGNOSIS — D48.9 NEOPLASM OF UNCERTAIN BEHAVIOR: ICD-10-CM

## 2024-08-13 DIAGNOSIS — Z12.83 SKIN CANCER SCREENING: ICD-10-CM

## 2024-08-13 DIAGNOSIS — D23.9 DERMATOFIBROMA: ICD-10-CM

## 2024-08-13 DIAGNOSIS — L82.1 SK (SEBORRHEIC KERATOSIS): ICD-10-CM

## 2024-08-13 PROCEDURE — 99213 OFFICE O/P EST LOW 20 MIN: CPT | Mod: 25 | Performed by: NURSE PRACTITIONER

## 2024-08-13 PROCEDURE — 11102 TANGNTL BX SKIN SINGLE LES: CPT | Performed by: NURSE PRACTITIONER

## 2024-08-13 NOTE — PROGRESS NOTES
DERMATOLOGY NOTE  NEW VISIT       Chief complaint: Establish Care (ALINE)       HPI/location: lesion on lt hip previously frozen off before poss SKs  Time present:  Painful lesion: Yes  Itching lesion: No  Enlarging lesion: No  Anything make it better or worse?    Pt reports lesion on arm and chest cyst-like but nothing ever drains out when attempting to squeeze it out       History of skin cancer: Yes, Details: BCC 1993 rt inner eye    History of precancers/actinic keratoses: No  History of biopsies:Yes, Details: above   History of blistering/severe sunburns:Yes, Details: multiple during youth   Family history of skin cancer:No  Family history of atypical moles:No    Allergies   Allergen Reactions    Nkda [No Known Drug Allergy]         MEDICATIONS:  Medications relevant to specialty reviewed.     REVIEW OF SYSTEMS:   Positive for skin (see HPI)  Negative for fevers and chills       EXAM:  There were no vitals taken for this visit.  Constitutional: Well-developed, well-nourished, and in no distress.     A total body skin exam was performed excluding the genitals per patient preference and including the following areas: head (including face), neck, chest, abdomen, groin/buttocks, back, bilateral upper extremities, and bilateral lower extremities with the following pertinent findings listed below and/or in assessment/plan.     Approx. 5 mm pink and light brown fibrous papule to LUE  DF chest, LUE  -sun exposed skin of trunk and b/l upper, lower extremities and face with scattered clinically benign light brown reticulated macules all of which were morphologically similar and none of which were suspicious for skin cancer today on exam  Several scattered 1-3mm bright red macules and thin papules on the trunk and extremities  Several light brown medium brown skin-colored stuck-on waxy papules scattered on the trunk and extremities  Multiple light brown medium brown skin-colored macules papules scattered over the trunk >>  extremities--All with benign-appearing pigment network patterns on dermoscopy      IMPRESSION / PLAN:    1. Neoplasm of uncertain behavior  Procedure Note   Procedure: Biopsy by shave technique  Location: LUE  Size: as noted in exam  Preoperative diagnosis:DF vs other  Risks, benefits and alternatives of procedure discussed, verbal consent obtained for photo (see chart) and written informed consent obtained for procedure. Time out completed. Area of biopsy prepped with alcohol. Anesthesia with 1% lidocaine with epinephrine administered with 30 gauge needle. Shave biopsy of the site performed. Hemostasis achieved with pressure and aluminum chloride. Vaseline applied to wound with bandage. Patient tolerated procedure well and there were no complications. The specimen was sent to the pathology lab by the staff. Wound care was discussed.      2. Lentigines  - Benign-appearing nature of lesions discussed during exam.   - Advised to continue to monitor for any return to clinic for new or concerning changes.      3. Cherry angioma  - Benign-appearing nature of lesions discussed during exam.   - Advised to continue to monitor for any return to clinic for new or concerning changes.      4. SK (seborrheic keratosis)  - Benign-appearing nature of lesions discussed during exam.   - courtesy LN2 applied to SK on L flank for cosmesis  - Advised to continue to monitor for any return to clinic for new or concerning changes.      5. Multiple nevi  - Benign-appearing nature of lesions discussed during exam.   - Advised to continue to monitor for any return to clinic for new or concerning changes.  - ABCDE's of melanoma discussed/handout given      6. Dermatofibroma, favored diagnosis  Shave bx done on lesion of LUE--see above  - Benign-appearing nature of lesions discussed during exam.   - Advised to continue to monitor for any return to clinic for new or concerning changes.      7. Skin cancer screening  Skin cancer  education  discussed importance of sun protective clothing, eyewear in addition to the use of broad spectrum sunscreen with SPF 30 or greater, as well as need for reapplication ~every 2 hours when exposed to UVR/handout given  discussed importance following up for any new or changing lesions as noted in handout given, but every 12 months exams in clinic in the setting of dermatologic history  ABCDE's of melanoma discussed/handout given        Discussed risks associated with LN2 and shave bx, Patient verbalized understanding and agrees with plan regarding the above            Please note that this dictation was created using voice recognition software. I have made every reasonable attempt to correct obvious errors, but I expect that there are errors of grammar and possibly content that I did not discover before finalizing the note.      Return to clinic in: Return in about 1 year (around 8/13/2025) for ALINE, Pending Bx results. and as needed for any new or changing skin lesions.

## 2025-05-16 ENCOUNTER — OFFICE VISIT (OUTPATIENT)
Dept: INTERNAL MEDICINE | Facility: IMAGING CENTER | Age: 70
End: 2025-05-16
Payer: MEDICARE

## 2025-05-16 VITALS
DIASTOLIC BLOOD PRESSURE: 62 MMHG | WEIGHT: 136.4 LBS | SYSTOLIC BLOOD PRESSURE: 112 MMHG | OXYGEN SATURATION: 96 % | BODY MASS INDEX: 23.29 KG/M2 | RESPIRATION RATE: 17 BRPM | HEIGHT: 64 IN | TEMPERATURE: 97.8 F | HEART RATE: 88 BPM

## 2025-05-16 DIAGNOSIS — Z00.00 HEALTH CARE MAINTENANCE: ICD-10-CM

## 2025-05-16 DIAGNOSIS — Z12.31 ENCOUNTER FOR SCREENING MAMMOGRAM FOR BREAST CANCER: ICD-10-CM

## 2025-05-16 DIAGNOSIS — H00.011 HORDEOLUM EXTERNUM OF RIGHT UPPER EYELID: Primary | ICD-10-CM

## 2025-05-16 PROBLEM — R92.8 ABNORMAL FINDINGS ON DIAGNOSTIC IMAGING OF BREAST: Status: RESOLVED | Noted: 2021-05-12 | Resolved: 2025-05-16

## 2025-05-16 PROBLEM — R31.0 GROSS HEMATURIA: Status: RESOLVED | Noted: 2021-04-02 | Resolved: 2025-05-16

## 2025-05-16 PROCEDURE — 3074F SYST BP LT 130 MM HG: CPT | Performed by: FAMILY MEDICINE

## 2025-05-16 PROCEDURE — 3078F DIAST BP <80 MM HG: CPT | Performed by: FAMILY MEDICINE

## 2025-05-16 PROCEDURE — 99214 OFFICE O/P EST MOD 30 MIN: CPT | Performed by: FAMILY MEDICINE

## 2025-05-16 RX ORDER — ERYTHROMYCIN 5 MG/G
1 OINTMENT OPHTHALMIC 2 TIMES DAILY
Qty: 3.5 G | Refills: 1 | Status: SHIPPED | OUTPATIENT
Start: 2025-05-16

## 2025-05-16 ASSESSMENT — FIBROSIS 4 INDEX: FIB4 SCORE: 1.11

## 2025-05-16 NOTE — PROGRESS NOTES
"Chief Complaint   Patient presents with    Eye Problem     Right upper eyelid stye that started yesterday.        HPI:  Patient is a 69 y.o. female established patient of Dr. Saini, who presents today for evaluation of new right upper eyelid stye that developed yesterday.     Patient Active Problem List    Diagnosis Date Noted    Dense breast tissue 01/08/2024    Mixed hyperlipidemia 05/17/2021    Tobacco dependence 11/11/2019    Vitamin D deficiency 05/29/2018    Osteopenia 08/16/2011    Anxiety     Chronic neck pain     Chronic back pain        Past medical, surgical, family, and social history was reviewed and updated in Epic chart by me today.     Medications and allergies reviewed and updated in Epic chart by me today.     ROS:  Pertinent positives listed above in HPI. All other systems have been reviewed and are negative.    PE:   /62 (BP Location: Left arm, Patient Position: Sitting, BP Cuff Size: Adult)   Pulse 88   Temp 36.6 °C (97.8 °F) (Temporal)   Resp 17   Ht 1.626 m (5' 4\")   Wt 61.9 kg (136 lb 6.4 oz)   SpO2 96%   BMI 23.41 kg/m²   Vital signs reviewed with patient.     Gen: Well developed; well nourished; no acute distress; age appropriate/ non toxic appearance   HEENT: Normocephalic; atraumatic; prominent right mid upper eyelid stye without signs of infection nor active drainage; PEERLA b/l; sclera clear b/l; b/l external auditory canals WNL; b/l TM WNL; nares patent; oropharynx clear; oral mucosa moist; tongue midline; dentition adequate   Neck: No adenopathy; no thyromegaly  CV: Regular rate and rhythm; S1/ S2 present; no murmur, gallop or rub noted  Pulm: No respiratory distress; clear to ascultation b/l; no wheezing or stridor noted b/l  Skin: Warm and dry; no rashes noted   Neuro: No focal deficits noted   Psych: AAOx4; mood and affect are appropriate    A/P:  1. Hordeolum externum of right upper eyelid (Primary)  Patient has new right mid upper eyelid stye without signs of " secondary infection. She reports eye lashes were matted shut this morning upon wakening, and she denies associated vision changes. She has been applying warm compresses to area and has had to have prior stye drained in the past. Recommend patient keep area clean and dry, start Erythromycin ointment BID, and continue frequent warm compress use. New RX sent to pharmacy and will follow response.   - erythromycin 5 MG/GM Ointment; Apply 1 Application to right eye 2 times a day.  Dispense: 3.5 g; Refill: 1    2. Encounter for screening mammogram for breast cancer  Patient is overdue for screening mammogram, and I encouraged her to call [facility] for appointment scheduling assistance (number provided to patient today).   - MA-SCREENING MAMMO BILAT W/TOMOSYNTHESIS W/CAD; Future    3. Health care maintenance  Patient has annual lab draw with Connie REDDY 7/14/25 and annual wellness visit with Dr. Saini on 7/21/25.

## 2025-05-21 ENCOUNTER — HOSPITAL ENCOUNTER (OUTPATIENT)
Dept: RADIOLOGY | Facility: MEDICAL CENTER | Age: 70
End: 2025-05-21
Attending: FAMILY MEDICINE
Payer: MEDICARE

## 2025-05-21 DIAGNOSIS — Z12.31 ENCOUNTER FOR SCREENING MAMMOGRAM FOR BREAST CANCER: ICD-10-CM

## 2025-05-21 PROCEDURE — 77067 SCR MAMMO BI INCL CAD: CPT

## 2025-06-18 ENCOUNTER — OFFICE VISIT (OUTPATIENT)
Dept: INTERNAL MEDICINE | Facility: IMAGING CENTER | Age: 70
End: 2025-06-18
Payer: MEDICARE

## 2025-06-18 VITALS
TEMPERATURE: 97.3 F | HEIGHT: 64 IN | BODY MASS INDEX: 23.4 KG/M2 | RESPIRATION RATE: 14 BRPM | OXYGEN SATURATION: 98 % | SYSTOLIC BLOOD PRESSURE: 110 MMHG | DIASTOLIC BLOOD PRESSURE: 65 MMHG | HEART RATE: 75 BPM

## 2025-06-18 DIAGNOSIS — K57.92 DIVERTICULITIS: Primary | ICD-10-CM

## 2025-06-18 PROCEDURE — 99214 OFFICE O/P EST MOD 30 MIN: CPT | Performed by: FAMILY MEDICINE

## 2025-06-18 PROCEDURE — 3078F DIAST BP <80 MM HG: CPT | Performed by: FAMILY MEDICINE

## 2025-06-18 PROCEDURE — 3074F SYST BP LT 130 MM HG: CPT | Performed by: FAMILY MEDICINE

## 2025-06-18 NOTE — PROGRESS NOTES
Verbal consent was acquired by the patient to use [employer] ambient listening note generation during this visit     Patient is a 69 y.o.female.established patient     History of Present Illness  The patient presents for evaluation of diverticulitis.    Diverticulitis  She is currently experiencing her second moderate episode of diverticulitis, with a history of approximately 30 mild episodes. She has never required antibiotic therapy for this condition. Her last colonoscopy was performed approximately 4 years ago, with no polyps detected. She reports no hematochezia. She has been managing her symptoms through dietary modifications, including a liquid diet for a day, which typically restores her to her baseline. She has been consuming Benefiber gummies every morning to maintain regular bowel movements but discontinued their use due to a day of pencil-thin stools followed by several days of diarrhea. She has been experiencing intermittent hot flashes and chills. During a recent camping trip, she experienced an episode of lightheadedness and weakness, accompanied by diaphoresis, but did not experience any pain.  - Onset: Second episode currently; history of approximately 30 mild episodes.  - Duration: Episodes managed through dietary modifications.  - Character: Mild episodes; no hematochezia; pencil-thin stools followed by diarrhea; intermittent hot flashes and chills.      For Mild episodes; no need for antibiotic therapy.    Sleep Disruption and Pyrexia  Over the past three days, she has been waking up feeling unrested after approximately 5 hours of sleep, compared to her usual 7 to 8 hours. She has been waking up with pyrexia, which has been disrupting her sleep. She recalls a similar episode in September 2024 while traveling in [place], which resolved within three days without medical intervention. She reports no emesis but does experience nausea.  - Onset: Over the past three days; similar episode in  "September 2024.  - Duration: Three days currently; previous episode resolved within three days.  - Character: Feeling unrested; pyrexia; nausea.  - Timing: Waking up after approximately 5 hours of sleep.  - Severity: Disrupting sleep.    Diet and Physical Activity  Her current diet includes yogurt with granola for breakfast, lentils for lunch, and a light dinner consisting of salads. She consumes one to two cups of coffee in the morning with creamer. She recently consumed a bowl of nuts and some deep-fried chicken at a barbecue, which resulted in nausea. Since Sunday, she has been monitoring her diet closely. She has been removing seeds from her vegetables before consumption. She has been engaging in physical activity, including stretching exercises and side bends.    FAMILY HISTORY  Her mother had developed polyps.            /65   Pulse 75   Temp 36.3 °C (97.3 °F)   Resp 14   Ht 1.626 m (5' 4.02\")   SpO2 98% , Body mass index is 23.4 kg/m².    Physical Exam  General Appearance: Normal.  Vital signs: Within normal limits.  HEENT: Within normal limits.  Respiratory:no distress  Gastrointestinal: Mild tenderness in the left lower quadrant, no tenderness in the liver area or central abdomen.  Skin: Warm and dry, no rash.  Neurological: Normal.               No visits with results within 1 Month(s) from this visit.   Latest known visit with results is:   Hospital Outpatient Visit on 12/28/2023   Component Date Value Ref Range Status    WBC 12/28/2023 9.5  4.8 - 10.8 K/uL Final    RBC 12/28/2023 5.17  4.20 - 5.40 M/uL Final    Hemoglobin 12/28/2023 16.6 (H)  12.0 - 16.0 g/dL Final    Hematocrit 12/28/2023 48.5 (H)  37.0 - 47.0 % Final    MCV 12/28/2023 93.8  81.4 - 97.8 fL Final    MCH 12/28/2023 32.1  27.0 - 33.0 pg Final    MCHC 12/28/2023 34.2  32.2 - 35.5 g/dL Final    Please note new reference range effective 05/22/2023.    RDW 12/28/2023 45.1  35.9 - 50.0 fL Final    Platelet Count 12/28/2023 241  164 - " 446 K/uL Final    MPV 12/28/2023 11.9  9.0 - 12.9 fL Final    Neutrophils-Polys 12/28/2023 54.30  44.00 - 72.00 % Final    Lymphocytes 12/28/2023 36.70  22.00 - 41.00 % Final    Monocytes 12/28/2023 5.20  0.00 - 13.40 % Final    Eosinophils 12/28/2023 2.30  0.00 - 6.90 % Final    Basophils 12/28/2023 1.30  0.00 - 1.80 % Final    Immature Granulocytes 12/28/2023 0.20  0.00 - 0.90 % Final    Nucleated RBC 12/28/2023 0.00  0.00 - 0.20 /100 WBC Final    Please note new reference range effective 05/22/2023.    Neutrophils (Absolute) 12/28/2023 5.13  1.82 - 7.42 K/uL Final    Comment: Includes immature neutrophils, if present.  Please note new reference range effective 05/22/2023.      Lymphs (Absolute) 12/28/2023 3.47  1.00 - 4.80 K/uL Final    Monos (Absolute) 12/28/2023 0.49  0.00 - 0.85 K/uL Final    Eos (Absolute) 12/28/2023 0.22  0.00 - 0.51 K/uL Final    Baso (Absolute) 12/28/2023 0.12  0.00 - 0.12 K/uL Final    Immature Granulocytes (abs) 12/28/2023 0.02  0.00 - 0.11 K/uL Final    NRBC (Absolute) 12/28/2023 0.00  K/uL Final    TSH 12/28/2023 4.200  0.380 - 5.330 uIU/mL Final    Comment: The 2011 American Thyroid Association (CHIP) guidelines  recommended that the interpretation of thyroid function in  pregnancy be based on trimester specific reference ranges.    1st Trimester  0.100-2.500 mIU/L  2nd Trimester  0.200-3.000 mIU/L  3rd Trimester  0.300-3.500 mIU/L    These established reference ranges have not been validated  at [facility].      Cholesterol,Tot 12/28/2023 155  100 - 199 mg/dL Final    Triglycerides 12/28/2023 142  0 - 149 mg/dL Final    HDL 12/28/2023 44  >=40 mg/dL Final    LDL 12/28/2023 83  <100 mg/dL Final    Sodium 12/28/2023 144  135 - 145 mmol/L Final    Potassium 12/28/2023 4.0  3.6 - 5.5 mmol/L Final    Chloride 12/28/2023 107  96 - 112 mmol/L Final    Co2 12/28/2023 26  20 - 33 mmol/L Final    Anion Gap 12/28/2023 11.0  7.0 - 16.0 Final    Glucose 12/28/2023 96  65 - 99 mg/dL  Final    Bun 12/28/2023 11  8 - 22 mg/dL Final    Creatinine 12/28/2023 0.78  0.50 - 1.40 mg/dL Final    Calcium 12/28/2023 9.5  8.5 - 10.5 mg/dL Final    Correct Calcium 12/28/2023 9.1  8.5 - 10.5 mg/dL Final    AST(SGOT) 12/28/2023 13  12 - 45 U/L Final    ALT(SGPT) 12/28/2023 11  2 - 50 U/L Final    Alkaline Phosphatase 12/28/2023 88  30 - 99 U/L Final    Total Bilirubin 12/28/2023 0.3  0.1 - 1.5 mg/dL Final    Albumin 12/28/2023 4.5  3.2 - 4.9 g/dL Final    Total Protein 12/28/2023 7.2  6.0 - 8.2 g/dL Final    Globulin 12/28/2023 2.7  1.9 - 3.5 g/dL Final    A-G Ratio 12/28/2023 1.7  g/dL Final    Vitamin B12 -True Cobalamin 12/28/2023 270  211 - 911 pg/mL Final    Glycohemoglobin 12/28/2023 5.7 (H)  4.0 - 5.6 % Final    Comment: Increased risk for diabetes:  5.7 -6.4%  Diabetes:  >6.4%  Glycemic control for adults with diabetes:  <7.0%    The above interpretations are per ADA guidelines.  Diagnosis  of diabetes mellitus on the basis of elevated Hemoglobin A1c  should be confirmed by repeating the Hb A1c test.      Est Avg Glucose 12/28/2023 117  mg/dL Final    Comment: The eAG calculation is based on the A1c-Derived Daily Glucose  (ADAG) study.  See the ADA's website for additional information.      25-Hydroxy   Vitamin D 25 12/28/2023 26 (L)  30 - 100 ng/mL Final    Comment: Adult Ranges:   <20 ng/mL - Deficiency  20-29 ng/mL - Insufficiency   ng/mL - Sufficiency  Electrochemiluminescence binding assay performed using Roche nicko e  immunoassay analyzer.  The Elecsys Vitamin D total II assay is intended for  the quantitative determination of total 25 hydroxyvitamin D in human serum  and plasma. This assay is to be used as an aid in the assessment of vitamin  D sufficiency in adults.      GFR (CKD-EPI) 12/28/2023 83  >60 mL/min/1.73 m 2 Final    Comment: Estimated Glomerular Filtration Rate is calculated using  race neutral CKD-EPI 2021 equation per NKF-ASN recommendations.                Assessment &  "Plan  Diverticulitis  She is experiencing her most severe episode of diverticulitis to date, characterized by fever, chills, and gastrointestinal symptoms. Increased tenderness in the abdominal area upon examination.  Diagnostic plan: A referral for a colonoscopy has been initiated.  Treatment plan: Advised to adhere to a diet that is easy to digest, avoiding dairy, meat, and salads. A prescription for Augmentin (amoxicillin/clavulanate) 875 mg/125 mg has been provided for 10 days, to be taken with food. She is advised to avoid fiber supplements until after the colonoscopy unless constipation occurs. The potential benefits of a gluten-free diet were discussed.  Clinical decision making: If she does not receive a message from the renewal referral office within a week, she should contact our office to check the status of the referral.  Follow-up: Contact our office to check the status of the referral within a week.                1. Diverticulitis (Primary)     - Referral to GI for Colonoscopy              Note to patients:  This physician note is written for the purpose of communication between medical providers and billing purposes.  There may be aspects of this documentation that are simplified for efficiency and clarity.  Physician notes are not intended to capture the entirety of the patient experience.  If you have questions about the way your medical condition and care was documented, please do not hesitate to bring this up at your next visit.     Portions of this chart may have been created with Dragon voice recognition software.  Occasional wrong-word or \"sound alike\" substitutions may have occurred due to the inherent limitations of voice recognition software.  Please read the chart carefully and recognize, using context, where the substitutions have occurred.    "

## 2025-06-24 ENCOUNTER — PATIENT MESSAGE (OUTPATIENT)
Dept: INTERNAL MEDICINE | Facility: IMAGING CENTER | Age: 70
End: 2025-06-24
Payer: MEDICARE

## 2025-06-24 NOTE — Clinical Note
REFERRAL APPROVAL NOTICE         Sent on June 24, 2025                   Gabriel Gonzalez  [address] [place] [address]                   Dear Ms. Gonzalez,    After a careful review of the medical information and benefit coverage, [facility] has processed your referral. See below for additional details.    If applicable, you must be actively enrolled with your insurance for coverage of the authorized service. If you have any questions regarding your coverage, please contact your insurance directly.    REFERRAL INFORMATION   Referral #:  [id number]  Referred-To Provider    Referred-By Provider:  Gastroenterology    DAYA Castaneda JONATHAN P      [address] [facility]  [address]  [place] [address]  [phone] [id number]  [address]  [place] [id number] [phone]    Referral Start Date:  06/18/2025  Referral End Date:   06/18/2026             SCHEDULING  If you do not already have an appointment, please call [phone] to make an appointment.     MORE INFORMATION  If you do not already have a eROI account, sign up at: [email or web address]  You can access your medical information, make appointments, see lab results, billing information, and more.  If you have questions regarding this referral, please contact  the [facility] Referrals department at:             [phone]. Monday - Friday 8:00AM - 5:00PM.     Sincerely,    [facility]

## 2025-07-07 ENCOUNTER — TELEPHONE (OUTPATIENT)
Dept: INTERNAL MEDICINE | Facility: IMAGING CENTER | Age: 70
End: 2025-07-07
Payer: MEDICARE

## 2025-07-07 DIAGNOSIS — Z79.899 MEDICATION MANAGEMENT: Primary | ICD-10-CM

## 2025-07-07 NOTE — TELEPHONE ENCOUNTER
----- Message from Nurse Connie SCHAEFFER R.N. sent at 7/7/2025  9:13 AM PDT -----  Please add fasting labs.    Thanks!

## 2025-07-09 ENCOUNTER — NON-PROVIDER VISIT (OUTPATIENT)
Dept: INTERNAL MEDICINE | Facility: IMAGING CENTER | Age: 70
End: 2025-07-09
Payer: MEDICARE

## 2025-07-09 ENCOUNTER — HOSPITAL ENCOUNTER (OUTPATIENT)
Facility: MEDICAL CENTER | Age: 70
End: 2025-07-09
Attending: FAMILY MEDICINE
Payer: MEDICARE

## 2025-07-09 DIAGNOSIS — Z79.899 MEDICATION MANAGEMENT: ICD-10-CM

## 2025-07-09 LAB
25(OH)D3 SERPL-MCNC: 30 NG/ML (ref 30–100)
ALBUMIN SERPL BCP-MCNC: 4.3 G/DL (ref 3.2–4.9)
ALBUMIN/GLOB SERPL: 1.7 G/DL
ALP SERPL-CCNC: 81 U/L (ref 30–99)
ALT SERPL-CCNC: 21 U/L (ref 2–50)
ANION GAP SERPL CALC-SCNC: 11 MMOL/L (ref 7–16)
AST SERPL-CCNC: 20 U/L (ref 12–45)
BASOPHILS # BLD AUTO: 1 % (ref 0–1.8)
BASOPHILS # BLD: 0.09 K/UL (ref 0–0.12)
BILIRUB SERPL-MCNC: 0.4 MG/DL (ref 0.1–1.5)
BUN SERPL-MCNC: 13 MG/DL (ref 8–22)
CALCIUM ALBUM COR SERPL-MCNC: 9.2 MG/DL (ref 8.5–10.5)
CALCIUM SERPL-MCNC: 9.4 MG/DL (ref 8.5–10.5)
CHLORIDE SERPL-SCNC: 109 MMOL/L (ref 96–112)
CHOLEST SERPL-MCNC: 131 MG/DL (ref 100–199)
CO2 SERPL-SCNC: 22 MMOL/L (ref 20–33)
CREAT SERPL-MCNC: 0.88 MG/DL (ref 0.5–1.4)
EOSINOPHIL # BLD AUTO: 0.13 K/UL (ref 0–0.51)
EOSINOPHIL NFR BLD: 1.5 % (ref 0–6.9)
ERYTHROCYTE [DISTWIDTH] IN BLOOD BY AUTOMATED COUNT: 47.2 FL (ref 35.9–50)
EST. AVERAGE GLUCOSE BLD GHB EST-MCNC: 111 MG/DL
GFR SERPLBLD CREATININE-BSD FMLA CKD-EPI: 71 ML/MIN/1.73 M 2
GLOBULIN SER CALC-MCNC: 2.6 G/DL (ref 1.9–3.5)
GLUCOSE SERPL-MCNC: 96 MG/DL (ref 65–99)
HBA1C MFR BLD: 5.5 % (ref 4–5.6)
HCT VFR BLD AUTO: 47.4 % (ref 37–47)
HDLC SERPL-MCNC: 40 MG/DL
HGB BLD-MCNC: 15.6 G/DL (ref 12–16)
IMM GRANULOCYTES # BLD AUTO: 0.02 K/UL (ref 0–0.11)
IMM GRANULOCYTES NFR BLD AUTO: 0.2 % (ref 0–0.9)
LDLC SERPL CALC-MCNC: 60 MG/DL
LYMPHOCYTES # BLD AUTO: 3.01 K/UL (ref 1–4.8)
LYMPHOCYTES NFR BLD: 34.4 % (ref 22–41)
MCH RBC QN AUTO: 31.3 PG (ref 27–33)
MCHC RBC AUTO-ENTMCNC: 32.9 G/DL (ref 32.2–35.5)
MCV RBC AUTO: 95 FL (ref 81.4–97.8)
MONOCYTES # BLD AUTO: 0.54 K/UL (ref 0–0.85)
MONOCYTES NFR BLD AUTO: 6.2 % (ref 0–13.4)
NEUTROPHILS # BLD AUTO: 4.95 K/UL (ref 1.82–7.42)
NEUTROPHILS NFR BLD: 56.7 % (ref 44–72)
NRBC # BLD AUTO: 0 K/UL
NRBC BLD-RTO: 0 /100 WBC (ref 0–0.2)
PLATELET # BLD AUTO: 241 K/UL (ref 164–446)
PMV BLD AUTO: 12.9 FL (ref 9–12.9)
POTASSIUM SERPL-SCNC: 4.1 MMOL/L (ref 3.6–5.5)
PROT SERPL-MCNC: 6.9 G/DL (ref 6–8.2)
RBC # BLD AUTO: 4.99 M/UL (ref 4.2–5.4)
SODIUM SERPL-SCNC: 142 MMOL/L (ref 135–145)
TRIGL SERPL-MCNC: 156 MG/DL (ref 0–149)
TSH SERPL-ACNC: 3.71 UIU/ML (ref 0.38–5.33)
VIT B12 SERPL-MCNC: 297 PG/ML (ref 211–911)
WBC # BLD AUTO: 8.7 K/UL (ref 4.8–10.8)

## 2025-07-09 PROCEDURE — 82306 VITAMIN D 25 HYDROXY: CPT

## 2025-07-09 PROCEDURE — 80061 LIPID PANEL: CPT

## 2025-07-09 PROCEDURE — 80053 COMPREHEN METABOLIC PANEL: CPT

## 2025-07-09 PROCEDURE — 85025 COMPLETE CBC W/AUTO DIFF WBC: CPT

## 2025-07-09 PROCEDURE — 83036 HEMOGLOBIN GLYCOSYLATED A1C: CPT

## 2025-07-09 PROCEDURE — 84443 ASSAY THYROID STIM HORMONE: CPT

## 2025-07-09 PROCEDURE — 82607 VITAMIN B-12: CPT

## 2025-07-21 ENCOUNTER — OFFICE VISIT (OUTPATIENT)
Dept: INTERNAL MEDICINE | Facility: IMAGING CENTER | Age: 70
End: 2025-07-21
Payer: COMMERCIAL

## 2025-07-21 VITALS
SYSTOLIC BLOOD PRESSURE: 124 MMHG | BODY MASS INDEX: 22.53 KG/M2 | WEIGHT: 132 LBS | HEIGHT: 64 IN | HEART RATE: 69 BPM | DIASTOLIC BLOOD PRESSURE: 68 MMHG | OXYGEN SATURATION: 98 % | TEMPERATURE: 97.3 F | RESPIRATION RATE: 14 BRPM

## 2025-07-21 DIAGNOSIS — Z00.00 MEDICARE ANNUAL WELLNESS VISIT, SUBSEQUENT: Primary | ICD-10-CM

## 2025-07-21 DIAGNOSIS — R10.32 LLQ PAIN: ICD-10-CM

## 2025-07-21 PROCEDURE — 3074F SYST BP LT 130 MM HG: CPT | Performed by: FAMILY MEDICINE

## 2025-07-21 PROCEDURE — 3078F DIAST BP <80 MM HG: CPT | Performed by: FAMILY MEDICINE

## 2025-07-21 PROCEDURE — G0439 PPPS, SUBSEQ VISIT: HCPCS | Performed by: FAMILY MEDICINE

## 2025-07-21 ASSESSMENT — ACTIVITIES OF DAILY LIVING (ADL): BATHING_REQUIRES_ASSISTANCE: 0

## 2025-07-21 ASSESSMENT — ENCOUNTER SYMPTOMS: GENERAL WELL-BEING: GOOD

## 2025-07-21 ASSESSMENT — FIBROSIS 4 INDEX: FIB4 SCORE: 1.23

## 2025-07-21 ASSESSMENT — PATIENT HEALTH QUESTIONNAIRE - PHQ9: CLINICAL INTERPRETATION OF PHQ2 SCORE: 0

## 2025-07-21 NOTE — PROGRESS NOTES
Verbal consent was acquired by the patient to use [employer] ambient listening note generation during this visit     HPI:  Gabriel Gonzalez is a 69 y.o. here for Medicare Annual Wellness Visit       History of Present Illness  The patient presents for evaluation of diverticulitis, vitamin B12 deficiency, urinary incontinence, and health maintenance.    Diverticulitis  She reports experiencing episodes of hot flashes, chills, and nausea occurring 6 to 8 times daily. These symptoms prompted her to seek medical attention following a 4-day period of constipation. Despite completing a 7-day course of Augmentin, there was no noticeable improvement. She also adhered to a liquid diet but continued to experience pyrexia and chills, indicative of a persistent infection. She requested another course of antibiotics, which she completed the day before her blood test. Since then, she has been on a soft food diet, but her stools remain thin and pencil-like, suggesting possible internal swelling or inflammation. This issue began in September 2024, but her condition has significantly worsened in the past month. She  feels unprepared for a colonoscopy due to perceived swelling. She recalls the infection spreading to her throat, causing dysphagia, which improved after a 7-day course of amoxicillin. She no longer experiences these symptoms post-treatment.  - Onset: Began in September 2024, significantly worsened in the past month.  - Location: GI tract, throat (previously).  - Duration: Persistent since September 2024.  - Character: Hot flashes, chills, nausea, constipation, pyrexia, chills, thin and pencil-like stools, dysphagia (previously).  - Alleviating/Aggravating Factors: Liquid diet, soft food diet, antibiotics x 2 (Augmentin, amoxicillin).  - Timing: Episodes occurring 6 to 8 times daily.  - Severity: Significant worsening in the past month,  ? of persistent infection.    Vitamin B12 Deficiency  She has experienced  weight loss since May 2025 and is attempting to maintain a daily caloric intake of over 2000 calories with the aid of Boost Plus. She prepares smoothies with half an avocado, consuming them around 3:00 PM. She reports no difficulty finding recipes for soft foods. She does not take any vitamin B12 supplements but relies on Boost Plus for caloric maintenance.  - Onset: Weight loss since May 2025.  - Duration: Persistent since May 2025.  - Character: Weight loss, reliance on Boost Plus for caloric intake.  - Alleviating Factors: Boost Plus, smoothies with half an avocado.  - Timing: Consumes smoothies around 3:00 PM.    Urinary Incontinence  She reports no issues with micturition and does not experience nocturia. However, she notes urgency with urination, risking incontinence if she does not void immediately. She feels she is losing some muscle control and is attempting to increase the frequency of Kegel exercises.  - Character: Urgency with urination, risking incontinence.  - Alleviating Factors: Increasing frequency of Kegel exercises.  - Severity: Risking incontinence if not voiding immediately.    Eye Issues (Stye and Chalazion)  She had a stye in her eye filled with pus on May 15, 2025. She applied hot compresses and was concerned when it ruptured that she lacked ophthalmic antibiotics, prompting her to seek treatment from Dr. Sanders. She was prescribed an ophthalmic solution. Later that day, while applying a hot compress, the stye ruptured, and she cleaned the area thoroughly, applying the ophthalmic solution to both eyes prophylactically. She subsequently developed a chalazion under other eyelid 5 days later.  - Onset: Stye on May 15, 2025; chalazion developed 5 days later.  - Location: Eye.  - Duration: Stye ruptured on the same day; chalazion developed 5 days later.  - Character: Stye filled with pus, chalazion under eyelid.  - Alleviating Factors: Hot compresses, ophthalmic solution.    Social  History:  Diet: She is currently on a soft food diet and plans to continue this regimen for 145 days. She prepares smoothies with half an avocado, consuming them around 3:00 PM.    PAST SURGICAL HISTORY:  She had a dysfunctional gallbladder identified through a contrast-enhanced test, revealing gallbladder function at only 3%. No cholelithiasis or other abnormalities were noted.    FAMILY HISTORY  Her sister has been in congestive heart failure for about 3 years now.         Patient Active Problem List    Diagnosis Date Noted    Dense breast tissue 01/08/2024    Mixed hyperlipidemia 05/17/2021    Tobacco dependence 11/11/2019    Vitamin D deficiency 05/29/2018    Osteopenia 08/16/2011    Anxiety     Chronic neck pain     Chronic back pain        Current Medications[1]       Current supplements as per medication list.     Allergies: Patient has no known allergies.    Current social contact/activities: friends/family     She  reports that she quit smoking about 24 years ago. Her smoking use included cigarettes. She started smoking about 39 years ago. She has a 7.5 pack-year smoking history. She has never used smokeless tobacco. She reports that she does not drink alcohol and does not use drugs.  Counseling given: Not Answered      ROS:          Gait: Uses no assistive device  Ostomy: No  Other tubes: No  Amputations: No  Chronic oxygen use: No  Last eye exam: Up-to-date  Wears hearing aids: No   : Denies any unmanageable urinary leakage during the last 6 months       Screening:     Depression Screening  Little interest or pleasure in doing things?  0 - not at all  Feeling down, depressed , or hopeless? 0 - not at all  Patient Health Questionnaire Score: 0     If depressive symptoms identified deferred to follow up visit unless specifically addressed in assessment and plan.    Interpretation of PHQ-9 Total Score   Score Severity   1-4 No Depression   5-9 Mild Depression   10-14 Moderate Depression   15-19 Moderately  Severe Depression   20-27 Severe Depression    Screening for Cognitive Impairment  Do you or any of your friends or family members have any concern about your memory? No  Three Minute Recall (Village, Kitchen, Baby) 3/3    Sunil clock face with all 12 numbers and set the hands to show 10 minutes past 11.  Yes    Cognitive concerns identified deferred for follow up unless specifically addressed in assessment and plan.    Fall Risk Assessment  Has the patient had two or more falls in the last year or any fall with injury in the last year?  No    Safety Assessment  Do you always wear your seatbelt?  Yes  Any changes to home needed to function safely? No  Difficulty hearing.  No  Patient counseled about all safety risks that were identified.    Functional Assessment ADLs  Are there any barriers preventing you from cooking for yourself or meeting nutritional needs?  No.    Are there any barriers preventing you from driving safely or obtaining transportation?  No.    Are there any barriers preventing you from using a telephone or calling for help?  No    Are there any barriers preventing you from shopping?  No.    Are there any barriers preventing you from taking care of your own finances?  No    Are there any barriers preventing you from managing your medications?  No    Are there any barriers preventing you from showering, bathing or dressing yourself? No    Are there any barriers preventing you from doing housework or laundry? No  Are there any barriers preventing you from using the toilet?No  Are you currently engaging in any exercise or physical activity?  Yes.      Self-Assessment of Health  What is your perception of your health? Good    Do you sleep more than six hours a night? Yes    In the past 7 days, how much did pain keep you from doing your normal work? None    Do you spend quality time with family or friends (virtually or in person)? Yes    Do you usually eat a heart healthy diet that constists of a variety  of fruits, vegetables, whole grains and fiber? Yes    Do you eat foods high in fat and/or Fast Food more than three times per week? No    How concerned are you that your medical conditions are not being well managed? Not at all    Are you worried that in the next 2 months, you may not have stable housing that you own, rent, or stay in as part of a household? No      Advance Care Planning  Do you have an Advance Directive, Living Will, Durable Power of , or POLST? No                 Health Maintenance Summary            Current Care Gaps       COVID-19 Vaccine (4 - 2024-25 season) Overdue since 9/1/2024      10/02/2022  Imm Admin: MODERNA BIVALENT BOOSTER SARS-COV-2 VACCINE (6+)    11/27/2021  Imm Admin: MODERNA SARS-COV-2 VACCINE (12+)    04/26/2021  Imm Admin: MODERNA SARS-COV-2 VACCINE (12+)    03/25/2021  Imm Admin: MODERNA SARS-COV-2 VACCINE (12+)                      Upcoming       Influenza Vaccine (1) Next due on 9/1/2025 01/08/2024  Imm Admin: Influenza Vaccine Adult HD    10/14/2021  Imm Admin: Influenza Vaccine Adult HD    11/11/2019  Imm Admin: Influenza Vaccine Quad Inj (Pf)              Bone Density Scan (Every 5 Years) Next due on 5/5/2026 05/05/2021  DS-BONE DENSITY STUDY (DEXA)    02/05/2018  DS-BONE DENSITY STUDY (DEXA)    03/17/2008  DS-BONE DENSITY STUDY (DEXA)              Mammogram (Yearly) Next due on 5/21/2026 05/21/2025  MA-SCREENING MAMMO BILAT W/TOMOSYNTHESIS W/CAD    03/25/2024  MA-DIAGNOSTIC MAMMO BILAT W/TOMOSYNTHESIS W/CAD    07/26/2022  MA-DIAGNOSTIC MAMMO BILAT W/TOMOSYNTHESIS W/CAD    01/06/2022  MA-DIAGNOSTIC MAMMO RIGHT W/TOMOSYNTHESIS W/CAD    05/11/2021  MA-DIAGNOSTIC MAMMO RIGHT W/TOMOSYNTHESIS W/O CAD     Only the first 5 history entries have been loaded, but more history exists.            Colorectal Cancer Screening (Colonoscopy - Preferred) Next due on 10/18/2028      06/18/2025  Order placed for Referral to GI for Colonoscopy by Tequila G O'Mary Carmen,  M.D.    10/18/2018  REFERRAL TO GI FOR COLONOSCOPY              IMM DTaP/Tdap/Td Vaccine (2 - Td or Tdap) Next due on 2/20/2030 02/20/2020  Imm Admin: Tdap Vaccine    07/23/2008  Imm Admin: TD Vaccine                      Completed or No Longer Recommended       Zoster (Shingles) Vaccines (Series Information) Completed      12/08/2020  Imm Admin: Zoster Vaccine Recombinant (RZV) (SHINGRIX)    02/20/2020  Imm Admin: Zoster Vaccine Recombinant (RZV) (SHINGRIX)              Pneumococcal Vaccine: 50+ Years (Series Information) Completed      01/08/2024  Imm Admin: Pneumococcal Conjugate Vaccine (PCV20)              Hepatitis C Screening  Addressed      02/02/2018  Reason not specified    02/01/2018  Hepatitis C Antibody component of HEP C VIRUS ANTIBODY              Hepatitis A Vaccine (Hep A) (Series Information) Aged Out      No completion history exists for this topic.              Hepatitis B Vaccine (Hep B) (Series Information) Aged Out     No completion history exists for this topic.              HPV Vaccines (Series Information) Aged Out     No completion history exists for this topic.              Polio Vaccine (Inactivated Polio) (Series Information) Aged Out     No completion history exists for this topic.              Meningococcal Immunization (Series Information) Aged Out     No completion history exists for this topic.              Meningococcal B Vaccine (Series Information) Aged Out     No completion history exists for this topic.              Cervical Cancer Screening  Discontinued        Frequency changed to Never automatically (Topic No Longer Applies)    02/20/2020  THINPREP PAP, REFLEX HPV ON ASC-US AND ABOVE    01/04/2018  THINPREP PAP, REFLEX HPV ON ASC-US AND ABOVE    04/10/2014  PAP IG, RFX HPV ASCU    01/11/2013  PAP IG, RFX HPV ASCU     Only the first 5 history entries have been loaded, but more history exists.            Annual Wellness Visit  Discontinued        Frequency changed to  "Never automatically (Topic No Longer Applies)    07/21/2025  Visit Dx: Medicare annual wellness visit, subsequent    07/21/2025  Level of Service: [place] ANNUAL WELLNESS VISIT-INCLUDES PPPS SUBSEQUE*    01/08/2024  Level of Service: [place] ANNUAL WELLNESS VISIT-INCLUDES PPPS SUBSEQUE*    01/08/2024  Visit Dx: Medicare annual wellness visit, subsequent      Only the first 5 history entries have been loaded, but more history exists.                            Patient Care Team:  Tequila Saini M.D. as PCP - General (Family Medicine)  Connie Hoang R.N. as Registered Nurse        Social History[2]  Family History   Problem Relation Age of Onset    Hypertension Mother     Heart Disease Mother         atrial fib    Diabetes Father     Cancer Father         pancreatic    Heart Disease Sister         valvular    Heart Disease Brother         a fib    Heart Disease Sister         sinus arrhythmia    Diabetes Sister     Hypertension Maternal Grandmother     Stroke Maternal Grandmother      She  has a past medical history of Anxiety, Arthritis, Chronic back pain, Chronic neck pain, Heart burn, History of fracture (4/10/2014), and OSTEOPOROSIS.   Past Surgical History[3]    Exam:   /68   Pulse 69   Temp 36.3 °C (97.3 °F)   Resp 14   Ht 1.626 m (5' 4.02\")   Wt 59.9 kg (132 lb)   SpO2 98%  Body mass index is 22.65 kg/m².         Physical Exam  Constitutional:       General: She is not in acute distress.     Appearance: Normal appearance.   HENT:      Head: Normocephalic and atraumatic.      Nose: Nose normal.      Mouth/Throat:      Mouth: Mucous membranes are moist.   Eyes:      Conjunctiva/sclera: Conjunctivae normal.   Cardiovascular:      Rate and Rhythm: Normal rate and regular rhythm.   Pulmonary:      Effort: Pulmonary effort is normal.      Breath sounds: Normal breath sounds. No wheezing.   Abdominal:      General: Abdomen is flat.      Palpations: Abdomen is soft.   Musculoskeletal:      Cervical back: No " rigidity.      Right lower leg: No edema.      Left lower leg: No edema.   Lymphadenopathy:      Cervical: No cervical adenopathy.   Skin:     Coloration: Skin is not jaundiced.      Findings: No erythema.   Neurological:      General: No focal deficit present.      Mental Status: She is alert and oriented to person, place, and time. Mental status is at baseline.   Psychiatric:         Mood and Affect: Mood normal.         Behavior: Behavior normal.         Thought Content: Thought content normal.         Judgment: Judgment normal.       Hospital Outpatient Visit on 07/09/2025   Component Date Value Ref Range Status    Glycohemoglobin 07/09/2025 5.5  4.0 - 5.6 % Final    Comment: Increased risk for diabetes:  5.7 -6.4%  Diabetes:  >6.4%  Glycemic control for adults with diabetes:  <7.0%    The above interpretations are per ADA guidelines.  Diagnosis  of diabetes mellitus on the basis of elevated Hemoglobin A1c  should be confirmed by repeating the Hb A1c test.      Est Avg Glucose 07/09/2025 111  mg/dL Final    Comment: The eAG calculation is based on the A1c-Derived Daily Glucose  (ADAG) study.  See the ADA's website for additional information.      Vitamin B12 -True Cobalamin 07/09/2025 297  211 - 911 pg/mL Final    Sodium 07/09/2025 142  135 - 145 mmol/L Final    Potassium 07/09/2025 4.1  3.6 - 5.5 mmol/L Final    Chloride 07/09/2025 109  96 - 112 mmol/L Final    Co2 07/09/2025 22  20 - 33 mmol/L Final    Anion Gap 07/09/2025 11.0  7.0 - 16.0 Final    Glucose 07/09/2025 96  65 - 99 mg/dL Final    Bun 07/09/2025 13  8 - 22 mg/dL Final    Creatinine 07/09/2025 0.88  0.50 - 1.40 mg/dL Final    Calcium 07/09/2025 9.4  8.5 - 10.5 mg/dL Final    Correct Calcium 07/09/2025 9.2  8.5 - 10.5 mg/dL Final    AST(SGOT) 07/09/2025 20  12 - 45 U/L Final    ALT(SGPT) 07/09/2025 21  2 - 50 U/L Final    Alkaline Phosphatase 07/09/2025 81  30 - 99 U/L Final    Total Bilirubin 07/09/2025 0.4  0.1 - 1.5 mg/dL Final    Albumin  07/09/2025 4.3  3.2 - 4.9 g/dL Final    Total Protein 07/09/2025 6.9  6.0 - 8.2 g/dL Final    Globulin 07/09/2025 2.6  1.9 - 3.5 g/dL Final    A-G Ratio 07/09/2025 1.7  g/dL Final    Cholesterol,Tot 07/09/2025 131  100 - 199 mg/dL Final    Triglycerides 07/09/2025 156 (H)  0 - 149 mg/dL Final    HDL 07/09/2025 40  >=40 mg/dL Final    LDL 07/09/2025 60  <100 mg/dL Final    TSH 07/09/2025 3.710  0.380 - 5.330 uIU/mL Final    Comment: The 2011 American Thyroid Association (CHIP) guidelines  recommended that the interpretation of thyroid function in  pregnancy be based on trimester specific reference ranges.    1st Trimester  0.100-2.500 uIU/L  2nd Trimester  0.200-3.000 uIU/L  3rd Trimester  0.300-3.500 uIU/L    These established reference ranges have not been validated  at [employer].      WBC 07/09/2025 8.7  4.8 - 10.8 K/uL Final    RBC 07/09/2025 4.99  4.20 - 5.40 M/uL Final    Hemoglobin 07/09/2025 15.6  12.0 - 16.0 g/dL Final    Hematocrit 07/09/2025 47.4 (H)  37.0 - 47.0 % Final    MCV 07/09/2025 95.0  81.4 - 97.8 fL Final    MCH 07/09/2025 31.3  27.0 - 33.0 pg Final    MCHC 07/09/2025 32.9  32.2 - 35.5 g/dL Final    RDW 07/09/2025 47.2  35.9 - 50.0 fL Final    Platelet Count 07/09/2025 241  164 - 446 K/uL Final    MPV 07/09/2025 12.9  9.0 - 12.9 fL Final    Neutrophils-Polys 07/09/2025 56.70  44.00 - 72.00 % Final    Lymphocytes 07/09/2025 34.40  22.00 - 41.00 % Final    Monocytes 07/09/2025 6.20  0.00 - 13.40 % Final    Eosinophils 07/09/2025 1.50  0.00 - 6.90 % Final    Basophils 07/09/2025 1.00  0.00 - 1.80 % Final    Immature Granulocytes 07/09/2025 0.20  0.00 - 0.90 % Final    Nucleated RBC 07/09/2025 0.00  0.00 - 0.20 /100 WBC Final    Neutrophils (Absolute) 07/09/2025 4.95  1.82 - 7.42 K/uL Final    Includes immature neutrophils, if present.    Lymphs (Absolute) 07/09/2025 3.01  1.00 - 4.80 K/uL Final    Monos (Absolute) 07/09/2025 0.54  0.00 - 0.85 K/uL Final    Eos (Absolute) 07/09/2025  0.13  0.00 - 0.51 K/uL Final    Baso (Absolute) 07/09/2025 0.09  0.00 - 0.12 K/uL Final    Immature Granulocytes (abs) 07/09/2025 0.02  0.00 - 0.11 K/uL Final    NRBC (Absolute) 07/09/2025 0.00  K/uL Final    25-Hydroxy   Vitamin D 25 07/09/2025 30  30 - 100 ng/mL Final    Comment: Adult Ranges:   <20 ng/mL - Deficiency  20-29 ng/mL - Insufficiency   ng/mL - Sufficiency  Electrochemiluminescence binding assay performed using Roche nicko e  immunoassay analyzer.  The Elecsys Vitamin D total II assay is intended for  the quantitative determination of total 25 hydroxyvitamin D in human serum  and plasma. This assay is to be used as an aid in the assessment of vitamin  D sufficiency in adults.      GFR (CKD-EPI) 07/09/2025 71  >60 mL/min/1.73 m 2 Final    Comment: Estimated Glomerular Filtration Rate is calculated using  race neutral CKD-EPI 2021 equation per NKF-ASN recommendations.     ]       Assessment and Plan. The following treatment and monitoring plan is recommended:     1. LLQ pain  - CT-ABDOMEN-PELVIS WITH & W/O; Future    2. Medicare annual wellness visit, subsequent      Assessment & Plan  Diverticulitis  She presents with persistent left-sided pain despite antibiotic treatment. The pain remains steady and is associated with bowel movements.  Diagnostic plan: A CT scan with IV contrast will be ordered to evaluate for diverticulitis or other inflammation. An endoscopy and colonoscopy will be scheduled once her condition stabilizes.    Vitamin B12 deficiency  Her B12 levels are within the normal range but could be optimized. Blood sugar levels have improved from borderline prediabetes to normal.  Treatment plan: She is advised to continue consuming Boost Plus, which provides 45% of her daily B12 requirement. If she discontinues Boost Plus, she should consider taking a multivitamin or B complex supplement.    Urinary incontinence  She experiences urgency and occasional loss of muscle control. No nocturia  reported, but urgency upon waking.  Treatment plan: She is encouraged to perform Kegel exercises regularly. If symptoms worsen, a referral for physical therapy will be considered.    Health maintenance  Blood pressure readings are within the normal range. Thyroid function is normal, but Vitamin D levels are just barely normal.  Treatment plan: She is advised to increase Vitamin D3 intake to 2000 units daily. She is overdue for her COVID-19 vaccine and is advised to receive it.    Follow-up: Next scheduled visit 6 m.                  Services suggested: No services needed at this time  Health Care Screening: Age-appropriate preventive services recommended by USPTF and ACIP covered by Medicare were discussed today. Services ordered if indicated and agreed upon by the patient.  Referrals offered: Community-based lifestyle interventions to reduce health risks and promote self-management and wellness, fall prevention, nutrition, physical activity, tobacco-use cessation, weight loss, and mental health services as per orders if indicated.    Discussion today about general wellness and lifestyle habits:    Prevent falls and reduce trip hazards; Cautioned about securing or removing rugs.  Have a working fire alarm and carbon monoxide detector;   Engage in regular physical activity and social activities     Follow-up:  Labs every 6 months and routine annual wellness exam           [1]   Current Outpatient Medications   Medication Sig Dispense Refill    amoxicillin-clavulanate (AUGMENTIN) 875-125 MG Tab Take 1 Tablet by mouth 2 times a day. 20 Tablet 0    erythromycin 5 MG/GM Ointment Apply 1 Application to right eye 2 times a day. 3.5 g 1    vitamin D (CHOLECALCIFEROL) 1000 Unit (25 mcg) Tab Take 2,000 Units by mouth every day.       No current facility-administered medications for this visit.   [2]   Social History  Tobacco Use    Smoking status: Former     Current packs/day: 0.00     Average packs/day: 0.5 packs/day for  15.0 years (7.5 ttl pk-yrs)     Types: Cigarettes     Start date: 10/1/1985     Quit date: 10/1/2000     Years since quittin.8    Smokeless tobacco: Never   Vaping Use    Vaping status: Never Used   Substance Use Topics    Alcohol use: No    Drug use: No   [3]   Past Surgical History:  Procedure Laterality Date    US-NEEDLE CORE BX-BREAST PANEL Right 2011    benign    BREAST BIOPSY  2009    Performed by JENNIFER GALAN at SURGERY SAME DAY [facility]    GANGLION EXCISION  08    Performed by INGRID OVIEDO at SURGERY [facility]    CARPAL TUNNEL ENDOSCOPIC  08    Performed by INGRID OVIEDO at SURGERY [facility]    COLONOSCOPY  2008    recheck 7 years    US-NEEDLE CORE BX-BREAST PANEL Left 1973    benign    LUMPECTOMY  1972    LEFT BREAST    CHOLECYSTECTOMY

## 2025-08-04 ENCOUNTER — HOSPITAL ENCOUNTER (OUTPATIENT)
Dept: RADIOLOGY | Facility: MEDICAL CENTER | Age: 70
End: 2025-08-04
Attending: FAMILY MEDICINE
Payer: MEDICARE

## 2025-08-04 DIAGNOSIS — R10.32 LLQ PAIN: ICD-10-CM

## 2025-08-04 PROCEDURE — 700117 HCHG RX CONTRAST REV CODE 255: Performed by: FAMILY MEDICINE

## 2025-08-04 PROCEDURE — 74178 CT ABD&PLV WO CNTR FLWD CNTR: CPT

## 2025-08-04 RX ADMIN — IOHEXOL 100 ML: 350 INJECTION, SOLUTION INTRAVENOUS at 14:46

## 2025-08-11 ENCOUNTER — PATIENT MESSAGE (OUTPATIENT)
Dept: INTERNAL MEDICINE | Facility: IMAGING CENTER | Age: 70
End: 2025-08-11
Payer: MEDICARE

## 2025-08-11 DIAGNOSIS — U07.1 COVID: Primary | ICD-10-CM
